# Patient Record
Sex: MALE | Race: WHITE | NOT HISPANIC OR LATINO | Employment: STUDENT | URBAN - METROPOLITAN AREA
[De-identification: names, ages, dates, MRNs, and addresses within clinical notes are randomized per-mention and may not be internally consistent; named-entity substitution may affect disease eponyms.]

---
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
## 2023-10-23 ENCOUNTER — OFFICE VISIT (OUTPATIENT)
Dept: FAMILY MEDICINE | Facility: CLINIC | Age: 18
End: 2023-10-23
Payer: COMMERCIAL

## 2023-10-23 VITALS
SYSTOLIC BLOOD PRESSURE: 128 MMHG | BODY MASS INDEX: 33.93 KG/M2 | RESPIRATION RATE: 16 BRPM | TEMPERATURE: 97.9 F | DIASTOLIC BLOOD PRESSURE: 62 MMHG | OXYGEN SATURATION: 97 % | HEIGHT: 67 IN | WEIGHT: 216.2 LBS

## 2023-10-23 DIAGNOSIS — F33.41 MAJOR DEPRESSIVE DISORDER, RECURRENT EPISODE, IN PARTIAL REMISSION (H): Primary | ICD-10-CM

## 2023-10-23 DIAGNOSIS — Z23 NEED FOR COVID-19 VACCINE: ICD-10-CM

## 2023-10-23 DIAGNOSIS — Z13.6 CARDIOVASCULAR SCREENING; LDL GOAL LESS THAN 160: ICD-10-CM

## 2023-10-23 DIAGNOSIS — Z91.09 ENVIRONMENTAL ALLERGIES: ICD-10-CM

## 2023-10-23 DIAGNOSIS — Z51.81 MEDICATION MONITORING ENCOUNTER: ICD-10-CM

## 2023-10-23 DIAGNOSIS — F41.9 ANXIETY: ICD-10-CM

## 2023-10-23 DIAGNOSIS — E66.811 CLASS 1 OBESITY WITHOUT SERIOUS COMORBIDITY WITH BODY MASS INDEX (BMI) OF 33.0 TO 33.9 IN ADULT, UNSPECIFIED OBESITY TYPE: ICD-10-CM

## 2023-10-23 DIAGNOSIS — Z23 NEED FOR HPV VACCINATION: ICD-10-CM

## 2023-10-23 PROCEDURE — 99214 OFFICE O/P EST MOD 30 MIN: CPT | Mod: 25 | Performed by: FAMILY MEDICINE

## 2023-10-23 PROCEDURE — 90480 ADMN SARSCOV2 VAC 1/ONLY CMP: CPT | Performed by: FAMILY MEDICINE

## 2023-10-23 PROCEDURE — 90471 IMMUNIZATION ADMIN: CPT | Performed by: FAMILY MEDICINE

## 2023-10-23 PROCEDURE — 90651 9VHPV VACCINE 2/3 DOSE IM: CPT | Performed by: FAMILY MEDICINE

## 2023-10-23 PROCEDURE — 91320 SARSCV2 VAC 30MCG TRS-SUC IM: CPT | Performed by: FAMILY MEDICINE

## 2023-10-23 RX ORDER — HYDROXYZINE HYDROCHLORIDE 10 MG/1
TABLET, FILM COATED ORAL
COMMUNITY
Start: 2022-12-15 | End: 2023-10-23

## 2023-10-23 RX ORDER — HYDROXYZINE HYDROCHLORIDE 10 MG/1
10 TABLET, FILM COATED ORAL EVERY 4 HOURS PRN
Qty: 30 TABLET | Refills: 1 | Status: SHIPPED | OUTPATIENT
Start: 2023-10-23

## 2023-10-23 ASSESSMENT — ANXIETY QUESTIONNAIRES
7. FEELING AFRAID AS IF SOMETHING AWFUL MIGHT HAPPEN: NOT AT ALL
5. BEING SO RESTLESS THAT IT IS HARD TO SIT STILL: SEVERAL DAYS
3. WORRYING TOO MUCH ABOUT DIFFERENT THINGS: SEVERAL DAYS
IF YOU CHECKED OFF ANY PROBLEMS ON THIS QUESTIONNAIRE, HOW DIFFICULT HAVE THESE PROBLEMS MADE IT FOR YOU TO DO YOUR WORK, TAKE CARE OF THINGS AT HOME, OR GET ALONG WITH OTHER PEOPLE: SOMEWHAT DIFFICULT
2. NOT BEING ABLE TO STOP OR CONTROL WORRYING: NOT AT ALL
GAD7 TOTAL SCORE: 3
GAD7 TOTAL SCORE: 3
6. BECOMING EASILY ANNOYED OR IRRITABLE: NOT AT ALL
1. FEELING NERVOUS, ANXIOUS, OR ON EDGE: SEVERAL DAYS
4. TROUBLE RELAXING: NOT AT ALL

## 2023-10-23 ASSESSMENT — PATIENT HEALTH QUESTIONNAIRE - PHQ9
10. IF YOU CHECKED OFF ANY PROBLEMS, HOW DIFFICULT HAVE THESE PROBLEMS MADE IT FOR YOU TO DO YOUR WORK, TAKE CARE OF THINGS AT HOME, OR GET ALONG WITH OTHER PEOPLE: SOMEWHAT DIFFICULT
SUM OF ALL RESPONSES TO PHQ QUESTIONS 1-9: 14
SUM OF ALL RESPONSES TO PHQ QUESTIONS 1-9: 14

## 2023-10-23 NOTE — PROGRESS NOTES
Assessment & Plan       ICD-10-CM    1. Major depressive disorder, recurrent episode, in partial remission (H24)  F33.41 sertraline (ZOLOFT) 50 MG tablet     TSH with free T4 reflex     REVIEW OF HEALTH MAINTENANCE PROTOCOL ORDERS     PRIMARY CARE FOLLOW-UP SCHEDULING     DISCONTINUED: sertraline (ZOLOFT) 50 MG tablet      2. Anxiety  F41.9 hydrOXYzine (ATARAX) 10 MG tablet     TSH with free T4 reflex     REVIEW OF HEALTH MAINTENANCE PROTOCOL ORDERS     PRIMARY CARE FOLLOW-UP SCHEDULING     DISCONTINUED: hydrOXYzine (ATARAX) 10 MG tablet      3. Class 1 obesity without serious comorbidity with body mass index (BMI) of 33.0 to 33.9 in adult, unspecified obesity type  E66.9 Adult Comprehensive Weight Management  Referral    Z68.33 Comprehensive metabolic panel     Lipid panel reflex to direct LDL Fasting     CBC with platelets     TSH with free T4 reflex     UA Macroscopic with reflex to Microscopic and Culture     REVIEW OF HEALTH MAINTENANCE PROTOCOL ORDERS     PRIMARY CARE FOLLOW-UP SCHEDULING      4. CARDIOVASCULAR SCREENING; LDL GOAL LESS THAN 160  Z13.6 Lipid panel reflex to direct LDL Fasting     REVIEW OF HEALTH MAINTENANCE PROTOCOL ORDERS     PRIMARY CARE FOLLOW-UP SCHEDULING      5. Environmental allergies  Z91.09 REVIEW OF HEALTH MAINTENANCE PROTOCOL ORDERS     PRIMARY CARE FOLLOW-UP SCHEDULING      6. Need for COVID-19 vaccine  Z23 COVID-19 12+ (2023-24) (PFIZER)     REVIEW OF HEALTH MAINTENANCE PROTOCOL ORDERS     PRIMARY CARE FOLLOW-UP SCHEDULING      7. Need for HPV vaccination  Z23 HPV9 (GARDASIL 9)     REVIEW OF HEALTH MAINTENANCE PROTOCOL ORDERS     PRIMARY CARE FOLLOW-UP SCHEDULING      8. Medication monitoring encounter  Z51.81 Comprehensive metabolic panel     Lipid panel reflex to direct LDL Fasting     CBC with platelets     TSH with free T4 reflex     UA Macroscopic with reflex to Microscopic and Culture     REVIEW OF HEALTH MAINTENANCE PROTOCOL ORDERS     PRIMARY CARE FOLLOW-UP  "SCHEDULING          Discussed treatment/modality options, including risk and benefits, he desires:    1) psychology / psychiatry    2)  weight loss clinic    3) fasting labs    4) Covid / HPV today, consider flu    5) med refills    All diagnosis above reviewed and noted above, otherwise stable.      See [facility] orders for further details.      BMI:   Estimated body mass index is 33.86 kg/m  as calculated from the following:    Height as of this encounter: 1.702 m (5' 7\").    Weight as of this encounter: 98.1 kg (216 lb 3.2 oz).   Weight management plan: diet and exercise    Depression Screening Follow Up        10/23/2023     9:41 AM   PHQ   PHQ-9 Total Score 14   Q9: Thoughts of better off dead/self-harm past 2 weeks Not at all     Follow Up Actions Taken  Psychology/psychiatry established      Return in about 1 year (around 10/23/2024) for Complete Physical, Medication Recheck Visit, Follow Up Chronic.   Follow-up Visit   Expected date:  Oct 23, 2024 (Approximate)      Follow Up Appointment Details:     Follow-up with whom?: PCP    Follow-Up for what?: Adult Preventive    Any Additional Chronic Condition Management?:  Anxiety  Depression       How?: In Person                   No LOS data to display    Doing chart review, history and exam, documentation and further activities as noted.           Marshall Mansfield MD, FAAFP     [facility]  [address] [place] [id number]  [facility] [email or web address]   Office: [phone]  Fax: [phone]  Pager: [phone]       Jean Lozano is a 18 year old, presenting for the following health issues:  Weight Problem        10/23/2023     9:43 AM   Additional Questions   Roomed by Roselyn KEANE       History of Present Illness       Reason for visit:  Genreral Check up and questions    He eats 2-3 servings of fruits and vegetables daily.He consumes 0 sweetened beverage(s) daily.He exercises " "with enough effort to increase his heart rate 60 or more minutes per day.  He exercises with enough effort to increase his heart rate 3 or less days per week. He is missing 2 dose(s) of medications per week.  He is not taking prescribed medications regularly due to side effects and other.     Concern - weight concerns   Onset: x 3 months   Description: weight concerns, been going to the gym x 3 months and not seeing any progress   Intensity: moderate  Progression of Symptoms:  same  Accompanying Signs & Symptoms: unable to lose any weight   Previous history of similar problem: n/a  Precipitating factors:        Worsened by: none   Alleviating factors:        Improved by: none   Therapies tried and outcome:exercise     Review of Systems   CONSTITUTIONAL: NEGATIVE for fever, chills, change in weight  INTEGUMENTARY/SKIN: NEGATIVE for worrisome rashes, moles or lesions  EYES: NEGATIVE for vision changes or irritation  ENT/MOUTH: NEGATIVE for ear, mouth and throat problems  RESP: NEGATIVE for significant cough or SOB  CV: NEGATIVE for chest pain, palpitations or peripheral edema  GI: NEGATIVE for nausea, abdominal pain, heartburn, or change in bowel habits  : NEGATIVE for frequency, dysuria, or hematuria  MUSCULOSKELETAL: NEGATIVE for significant arthralgias or myalgia  NEURO: NEGATIVE for weakness, dizziness or paresthesias  ENDOCRINE: NEGATIVE for temperature intolerance, skin/hair changes  HEME: NEGATIVE for bleeding problems  PSYCHIATRIC: NEGATIVE for changes in mood or affect      Objective    /62   Temp 97.9  F (36.6  C) (Tympanic)   Resp 16   Ht 1.702 m (5' 7\")   Wt 98.1 kg (216 lb 3.2 oz)   SpO2 97%   BMI 33.86 kg/m    Body mass index is 33.86 kg/m .    Physical Exam   GENERAL: healthy, alert and no distress  EYES: Eyes grossly normal to inspection, PERRL and conjunctivae and sclerae normal  HENT: ear canals and TM's normal, nose and mouth without ulcers or lesions  NECK: no adenopathy, no " asymmetry, masses, or scars and thyroid normal to palpation  RESP: lungs clear to auscultation - no rales, rhonchi or wheezes  CV: regular rate and rhythm, normal S1 S2, no S3 or S4, no murmur, click or rub, no peripheral edema and peripheral pulses strong  ABDOMEN: soft, nontender, no hepatosplenomegaly, no masses and bowel sounds normal  MS: no gross musculoskeletal defects noted, no edema  SKIN: no suspicious lesions or rashes  NEURO: Normal strength and tone, mentation intact and speech normal  PSYCH: mentation appears normal, affect normal/bright    Fasting labs soon

## 2023-10-30 ENCOUNTER — LAB (OUTPATIENT)
Dept: LAB | Facility: CLINIC | Age: 18
End: 2023-10-30
Payer: COMMERCIAL

## 2023-10-30 ENCOUNTER — VIRTUAL VISIT (OUTPATIENT)
Dept: SURGERY | Facility: CLINIC | Age: 18
End: 2023-10-30
Payer: COMMERCIAL

## 2023-10-30 VITALS — HEIGHT: 67 IN | WEIGHT: 216 LBS | BODY MASS INDEX: 33.9 KG/M2

## 2023-10-30 DIAGNOSIS — Z51.81 MEDICATION MONITORING ENCOUNTER: ICD-10-CM

## 2023-10-30 DIAGNOSIS — E66.811 CLASS 1 OBESITY WITHOUT SERIOUS COMORBIDITY WITH BODY MASS INDEX (BMI) OF 33.0 TO 33.9 IN ADULT, UNSPECIFIED OBESITY TYPE: ICD-10-CM

## 2023-10-30 DIAGNOSIS — F41.9 ANXIETY: ICD-10-CM

## 2023-10-30 DIAGNOSIS — F33.41 MAJOR DEPRESSIVE DISORDER, RECURRENT EPISODE, IN PARTIAL REMISSION (H): ICD-10-CM

## 2023-10-30 DIAGNOSIS — E66.09 CLASS 1 OBESITY DUE TO EXCESS CALORIES WITHOUT SERIOUS COMORBIDITY WITH BODY MASS INDEX (BMI) OF 33.0 TO 33.9 IN ADULT: Primary | ICD-10-CM

## 2023-10-30 DIAGNOSIS — Z13.6 CARDIOVASCULAR SCREENING; LDL GOAL LESS THAN 160: ICD-10-CM

## 2023-10-30 DIAGNOSIS — E66.811 CLASS 1 OBESITY DUE TO EXCESS CALORIES WITHOUT SERIOUS COMORBIDITY WITH BODY MASS INDEX (BMI) OF 33.0 TO 33.9 IN ADULT: Primary | ICD-10-CM

## 2023-10-30 LAB
ALBUMIN SERPL BCG-MCNC: 4.3 G/DL (ref 3.5–5.2)
ALBUMIN UR-MCNC: NEGATIVE MG/DL
ALP SERPL-CCNC: 75 U/L (ref 40–129)
ALT SERPL W P-5'-P-CCNC: 23 U/L (ref 0–70)
ANION GAP SERPL CALCULATED.3IONS-SCNC: 10 MMOL/L (ref 7–15)
APPEARANCE UR: CLEAR
AST SERPL W P-5'-P-CCNC: 28 U/L (ref 0–45)
BILIRUB SERPL-MCNC: 1.1 MG/DL
BILIRUB UR QL STRIP: NEGATIVE
BUN SERPL-MCNC: 13.2 MG/DL (ref 6–20)
CALCIUM SERPL-MCNC: 9.4 MG/DL (ref 8.6–10)
CHLORIDE SERPL-SCNC: 102 MMOL/L (ref 98–107)
CHOLEST SERPL-MCNC: 174 MG/DL
COLOR UR AUTO: YELLOW
CREAT SERPL-MCNC: 0.83 MG/DL (ref 0.67–1.17)
DEPRECATED HCO3 PLAS-SCNC: 27 MMOL/L (ref 22–29)
EGFRCR SERPLBLD CKD-EPI 2021: >90 ML/MIN/1.73M2
ERYTHROCYTE [DISTWIDTH] IN BLOOD BY AUTOMATED COUNT: 12.2 % (ref 10–15)
GLUCOSE SERPL-MCNC: 90 MG/DL (ref 70–99)
GLUCOSE UR STRIP-MCNC: NEGATIVE MG/DL
HCT VFR BLD AUTO: 45.2 % (ref 40–53)
HDLC SERPL-MCNC: 58 MG/DL
HGB BLD-MCNC: 15.3 G/DL (ref 13.3–17.7)
HGB UR QL STRIP: NEGATIVE
KETONES UR STRIP-MCNC: NEGATIVE MG/DL
LDLC SERPL CALC-MCNC: 101 MG/DL
LEUKOCYTE ESTERASE UR QL STRIP: NEGATIVE
MCH RBC QN AUTO: 30.5 PG (ref 26.5–33)
MCHC RBC AUTO-ENTMCNC: 33.8 G/DL (ref 31.5–36.5)
MCV RBC AUTO: 90 FL (ref 78–100)
NITRATE UR QL: NEGATIVE
NONHDLC SERPL-MCNC: 116 MG/DL
PH UR STRIP: 7 [PH] (ref 5–7)
PLATELET # BLD AUTO: 261 10E3/UL (ref 150–450)
POTASSIUM SERPL-SCNC: 4.4 MMOL/L (ref 3.4–5.3)
PROT SERPL-MCNC: 7.5 G/DL (ref 6.4–8.3)
RBC # BLD AUTO: 5.01 10E6/UL (ref 4.4–5.9)
SODIUM SERPL-SCNC: 139 MMOL/L (ref 135–145)
SP GR UR STRIP: 1.01 (ref 1–1.03)
TRIGL SERPL-MCNC: 75 MG/DL
TSH SERPL DL<=0.005 MIU/L-ACNC: 2.16 UIU/ML (ref 0.3–4.2)
UROBILINOGEN UR STRIP-ACNC: 0.2 E.U./DL
WBC # BLD AUTO: 7.5 10E3/UL (ref 4–11)

## 2023-10-30 PROCEDURE — 99205 OFFICE O/P NEW HI 60 MIN: CPT | Mod: 95 | Performed by: FAMILY MEDICINE

## 2023-10-30 PROCEDURE — 80061 LIPID PANEL: CPT

## 2023-10-30 PROCEDURE — 36415 COLL VENOUS BLD VENIPUNCTURE: CPT

## 2023-10-30 PROCEDURE — 81003 URINALYSIS AUTO W/O SCOPE: CPT

## 2023-10-30 PROCEDURE — 80053 COMPREHEN METABOLIC PANEL: CPT

## 2023-10-30 PROCEDURE — 85027 COMPLETE CBC AUTOMATED: CPT

## 2023-10-30 PROCEDURE — 84443 ASSAY THYROID STIM HORMONE: CPT

## 2023-10-30 ASSESSMENT — SLEEP AND FATIGUE QUESTIONNAIRES
HOW LIKELY ARE YOU TO NOD OFF OR FALL ASLEEP WHILE SITTING QUIETLY AFTER LUNCH WITHOUT ALCOHOL: WOULD NEVER DOZE
HOW LIKELY ARE YOU TO NOD OFF OR FALL ASLEEP WHILE SITTING INACTIVE IN A PUBLIC PLACE: WOULD NEVER DOZE
HOW LIKELY ARE YOU TO NOD OFF OR FALL ASLEEP IN A CAR, WHILE STOPPED FOR A FEW MINUTES IN TRAFFIC: WOULD NEVER DOZE
HOW LIKELY ARE YOU TO NOD OFF OR FALL ASLEEP WHILE SITTING AND TALKING TO SOMEONE: WOULD NEVER DOZE
HOW LIKELY ARE YOU TO NOD OFF OR FALL ASLEEP WHILE LYING DOWN TO REST IN THE AFTERNOON WHEN CIRCUMSTANCES PERMIT: MODERATE CHANCE OF DOZING
HOW LIKELY ARE YOU TO NOD OFF OR FALL ASLEEP WHEN YOU ARE A PASSENGER IN A CAR FOR AN HOUR WITHOUT A BREAK: WOULD NEVER DOZE
HOW LIKELY ARE YOU TO NOD OFF OR FALL ASLEEP WHILE WATCHING TV: WOULD NEVER DOZE
HOW LIKELY ARE YOU TO NOD OFF OR FALL ASLEEP WHILE SITTING AND READING: WOULD NEVER DOZE

## 2023-10-30 NOTE — PROGRESS NOTES
Marta Del Cid is 18 year old  male who presents for a billable video visit today.    How would you like to obtain your AVS? MyChart  If dropped from the video visit, the video invitation should be resent by: Send to e-mail at: [email or web address]  Will anyone else be joining your video visit? No      Video Start Time: 1:10 PM    Are there any specific questions or needs that you would like addressed at your visit today? Pt has no question or concerns          Video-Visit Details    Type of service:  Video Visit    Platform used for Video Visit: [employer]    Video End Time (time video stopped): 1:58 PM    Originating Location (pt. Location): Home      Distant Location (provider location):  On-site    Distant Location (provider location):  [facility] AND BARIATRICS CARE [place]

## 2023-10-30 NOTE — LETTER
"    10/30/2023         RE: Marta Del Cid  [address] [place] [address]        Dear Colleague,    Thank you for referring your patient, Marta Del Cid, to the [facility] [place]. Please see a copy of my visit note below.        New Medical Weight Management Consult    PATIENT:  Marta Del Cid  MRN:         [id number]  :         2005  PHILLIP:         10/30/2023    Dear Dr. Mansfield,    I had the pleasure of seeing your patient, Marta Del Cid. Full intake/assessment was done to determine barriers to weight loss success and develop a treatment plan. Marta Del Cid is a 18 year old male interested in treatment of medical problems associated with excess weight. He has a height of 5' 7\", a weight of 216 lbs 0 oz, and the calculated Body mass index is 33.83 kg/m .    ASSESSMENT/PLAN:  1. Class 1 obesity due to excess calories without serious comorbidity with body mass index (BMI) of 33.0 to 33.9 in adult  We discussed healthy habits to assist with weight loss. He will work on planning meals ahead of time using the plate method for portion control and macronutrient proportions. He will try keeping a food journal. I suggested he increase his exercise.  We discussed medication that may assist with weight loss. He may be a candidate for topamax. He declined for now. He will be following up with our dietician. He is concerned that he is not eating enough.    2. Major depressive disorder, recurrent episode, in partial remission (H24)  I believe this is contributing to his fatigue which is affecting his weight loss efforts.       - Adult Comprehensive Weight Management  Referral     He has the following co-morbidities:        10/30/2023     8:59 AM   --   I have the following health issues associated with obesity None of the above   I have the following symptoms associated with obesity Depression             10/30/2023     8:59 AM   Referring Provider   Please name the provider who " "referred you to Medical Weight Management  If you do not know, please answer \"I Don't Know\" Marshall Mansfield           10/30/2023     8:59 AM   Weight History   How concerned are you about your weight? Very Concerned   I became overweight In College   The following factors have contributed to my weight gain Mental Health Issues    Eating Wrong Types of Food    Eating Too Much    Lack of Exercise    Stress   I have tried the following methods to lose weight Watching Portions or Calories    Exercise    Atkins-type Diet (Low Carb/High Protein)    Fasting   My lowest weight since age 16 was 171   My highest weight since age 16 was 216   The most weight I have ever lost was (lbs) 47   I have the following family history of obesity/being overweight My father is overweight    One or more of my siblings are overweight   How has your weight changed over the last year? Gained   How many pounds? 45 Ibs     Patient has been struggling with depression over the past year. His motivation was very low. He took a gap year in college. His habits got off track. .More recently his has gotten back on track with exercise and he is working on eating healthier. He currently is living with his parents and 2 siblings.        10/30/2023     8:59 AM   Diet Recall Review with Patient   If you do eat breakfast, what types of food do you eat? Protein Bar  L: grilled cheese with greek yogurt   If you do eat supper, what types of food do you typically eat? Rice, Chicken, Salad   If you do snack, what types of food do you typically eat? Protein Bar, Banana, Cucumber, Cherry Tomatoes, Chips, salad   How many glasses of juice do you drink in a typical day? 0   How many of glasses of milk do you drink in a typical day? 0   How many 8oz glasses of sugar containing drinks such as Josue-Aid/sweet tea do you drink in a day? 0   How many cans/bottles of sugar pop/soda/tea/sports drinks do you drink in a day? 0   How many cans/bottles of diet pop/soda/tea or sports " drink do you drink in a day? 1   How often do you have a drink of alcohol? Never           10/30/2023     8:59 AM   Eating Habits   Generally, my meals include foods like these bread, pasta, rice, potatoes, corn, crackers, sweet dessert, pop, or juice A Few Times a Week   Generally, my meals include foods like these fried meats, brats, burgers, french fries, pizza, cheese, chips, or ice cream A Few Times a Week   Eat fast food (like McDonalds, [employer], [employer]) Less Than Weekly   Eat at a buffet or sit-down restaurant Never   Eat most of my meals in front of the TV or computer Never   Often skip meals, eat at random times, have no regular eating times Almost Everyday   Rarely sit down for a meal but snack or graze throughout A Few Times a Week   Eat extra snacks between meals Once a Week   Eat most of my food at the end of the day A Few Times a Week   Eat in the middle of the night or wake up at night to eat Never   Eat extra snacks to prevent or correct low blood sugar Never   Eat to prevent acid reflux or stomach pain Never   Worry about not having enough food to eat Never   I eat when I am depressed Never   I eat when I am stressed A Few Times a Week   I eat when I am bored A Few Times a Week   I eat when I am anxious Never   I eat when I am happy or as a reward Less Than Weekly   I feel hungry all the time even if I just have eaten Never   Feeling full is important to me Never   I finish all the food on my plate even if I am already full A Few Times a Week   I can't resist eating delicious food or walk past the good food/smell Never   I eat/snack without noticing that I am eating Never   I eat when I am preparing the meal Everyday   I eat more than usual when I see others eating Never   I have trouble not eating sweets, ice cream, cookies, or chips if they are around the house A Few Times a Week   I think about food all day Never   What foods, if any, do you crave? Sweets/Candy/Chocolate            10/30/2023     8:59 AM   Amount of Food   I feel out of control when eating Weekly   I eat a large amount of food, like a loaf of bread, a box of cookies, a pint/quart of ice cream, all at once Never   I eat a large amount of food even when I am not hungry Never   I eat rapidly Never   I eat alone because I feel embarrassed and do not want others to see how much I have eaten Never   I eat until I am uncomfortably full Never   I feel bad, disgusted, or guilty after I overeat Everyday           10/30/2023     8:59 AM   Activity/Exercise History   How much of a typical 12 hour day do you spend sitting? Half the Day   How much of a typical 12 hour day do you spend lying down? Less Than Half the Day   How much of a typical day do you spend walking/standing? Less Than Half the Day   How many hours (not including work) do you spend on the TV/Video Games/Computer/Tablet/Phone? 4-5 Hours   How many times a week are you active for the purpose of exercise? 2-3 Times a Week- goes to the gym. Strength training and eliptical   What keeps you from being more active? Too tired   How many total minutes do you spend doing some activity for the purpose of exercising when you exercise? More Than 30 Minutes     He states that he has been exercising for 2 months and instead of losing weight he has been gaining weight. His clothes feel tighter. He used to stress eat often when he was younger. He states he occasionally tracks his food and tends to stay under 1600 calories. His primary MD ordered some blood tests which are pending.     PAST MEDICAL HISTORY:  Past Medical History:   Diagnosis Date     Anxiety      CARDIOVASCULAR SCREENING; LDL GOAL LESS THAN 160      Environmental allergies      Major depressive disorder, recurrent episode, in partial remission (H24)            10/30/2023     8:59 AM   Work/Social History Reviewed With Patient   My employment status is Unemployed    Student   What is your marital status? Single   Who do you  live with? Parents   Who does the food shopping? Mother           10/30/2023     8:59 AM   Mental Health History Reviewed With Patient   Have you ever been physically or sexually abused? No   How often in the past 2 weeks have you felt little interest or pleasure in doing things? More Than Half the Days   Over the past 2 weeks how often have you felt down, depressed, or hopeless? More Than Half the Days     Patient has depression and anxiety. The anxiety has improved dramatically in the past year. He states he no longer has real panic attacks. He is sensitive to caffeine. A cup of coffee will make him anxious for the rest of the day.        10/30/2023     8:59 AM   Sleep History Reviewed With Patient   How many hours do you sleep at night? 6       MEDICATIONS:   Current Outpatient Medications   Medication Sig Dispense Refill     hydrOXYzine (ATARAX) 10 MG tablet Take 1 tablet (10 mg) by mouth every 4 hours as needed for anxiety 30 tablet 1     IBUPROFEN as needed       sertraline (ZOLOFT) 50 MG tablet Take 1 tablet (50 mg) by mouth daily 90 tablet 1       ALLERGIES:   Allergies   Allergen Reactions     Seasonal Allergies      ROS  General  Fatigue: yes  HEENT  Hx of glaucoma: no  Vision changes: no  Cardiovascular  Hx of heart disease: no  Chest Pain with Exertion: sometimes  Palpitations: no  Pulmonary  Shortness of breath at rest: no  Shortness of breath with exertion: no  Gastrointestinal  Heartburn: no  Pancreatitis: no  Psychiatric  Moods Stable: somewhat  Endocrine  Polydipsia: no  No personal or family history of medullary thyroid cancer: no  Neurologic  Hx of seizures: no  Migraine headaches: history of    Birth control: male  Kidney stones: no     PHYSICAL EXAM:  Wt Readings from Last 4 Encounters:   10/30/23 98 kg (216 lb)   10/23/23 98.1 kg (216 lb 3.2 oz)   08/10/21 77.8 kg (171 lb 9.6 oz) (80%, Z= 0.85)*   09/04/19 89.7 kg (197 lb 12.8 oz) (97%, Z= 1.93)*     * Growth percentiles are based on CDC  (Boys, 2-20 Years) data.      BP Readings from Last 3 Encounters:   10/23/23 128/62   08/10/21 108/64 (20%, Z = -0.84 /  37%, Z = -0.33)*   09/04/19 116/73 (62%, Z = 0.31 /  78%, Z = 0.77)*     *BP percentiles are based on the 2017 AAP Clinical Practice Guideline for boys      GENERAL: Healthy, alert and no distress  EYES: Eyes grossly normal to inspection.  No discharge or erythema, or obvious scleral/conjunctival abnormalities.  RESP: No audible wheeze, cough, or visible cyanosis.  No visible retractions or increased work of breathing.    SKIN: Visible skin clear. No significant rash, abnormal pigmentation or lesions.  NEURO: Cranial nerves grossly intact.  Mentation and speech appropriate for age.  PSYCH: Mentation appears normal, affect normal/bright, judgement and insight intact, normal speech and appearance well-groomed.     FOLLOW-UP:   12 weeks.    Total time spent on the date of this encounter doing: chart review, review of test results, patient visit, physical exam, education, counseling, developing plan of care and documenting = 60 minutes.     Sincerely,    REA Murcai MD          Ali PHONG Ambrosiosa is 18 year old  male who presents for a billable video visit today.    How would you like to obtain your AVS? MyChart  If dropped from the video visit, the video invitation should be resent by: Send to e-mail at: [email or web address]  Will anyone else be joining your video visit? No      Video Start Time: 1:10 PM    Are there any specific questions or needs that you would like addressed at your visit today? Pt has no question or concerns          Video-Visit Details    Type of service:  Video Visit    Platform used for Video Visit: [employer]    Video End Time (time video stopped): 1:58 PM    Originating Location (pt. Location): Home      Distant Location (provider location):  On-site    Distant Location (provider location):  [facility] SURGERY [facility] AND BARIATRICS CARE [place]        Again, thank you for  allowing me to participate in the care of your patient.        Sincerely,        REA Murcia MD

## 2023-10-30 NOTE — PROGRESS NOTES
"    New Medical Weight Management Consult    PATIENT:  Marta Del Cid  MRN:         [id number]  :         2005  PHILLIP:         10/30/2023    Dear Dr. Mansfield,    I had the pleasure of seeing your patient, Marta Del Cid. Full intake/assessment was done to determine barriers to weight loss success and develop a treatment plan. Marta Del Cid is a 18 year old male interested in treatment of medical problems associated with excess weight. He has a height of 5' 7\", a weight of 216 lbs 0 oz, and the calculated Body mass index is 33.83 kg/m .    ASSESSMENT/PLAN:  1. Class 1 obesity due to excess calories without serious comorbidity with body mass index (BMI) of 33.0 to 33.9 in adult  We discussed healthy habits to assist with weight loss. He will work on planning meals ahead of time using the plate method for portion control and macronutrient proportions. He will try keeping a food journal. I suggested he increase his exercise.  We discussed medication that may assist with weight loss. He may be a candidate for topamax. He declined for now. He will be following up with our dietician. He is concerned that he is not eating enough.    2. Major depressive disorder, recurrent episode, in partial remission (H24)  I believe this is contributing to his fatigue which is affecting his weight loss efforts.       - Adult Comprehensive Weight Management  Referral     He has the following co-morbidities:        10/30/2023     8:59 AM   --   I have the following health issues associated with obesity None of the above   I have the following symptoms associated with obesity Depression             10/30/2023     8:59 AM   Referring Provider   Please name the provider who referred you to Medical Weight Management  If you do not know, please answer \"I Don't Know\" Marshall Mansfield           10/30/2023     8:59 AM   Weight History   How concerned are you about your weight? Very Concerned   I became overweight In College   The following factors have " contributed to my weight gain Mental Health Issues    Eating Wrong Types of Food    Eating Too Much    Lack of Exercise    Stress   I have tried the following methods to lose weight Watching Portions or Calories    Exercise    Atkins-type Diet (Low Carb/High Protein)    Fasting   My lowest weight since age 16 was 171   My highest weight since age 16 was 216   The most weight I have ever lost was (lbs) 47   I have the following family history of obesity/being overweight My father is overweight    One or more of my siblings are overweight   How has your weight changed over the last year? Gained   How many pounds? 45 Ibs     Patient has been struggling with depression over the past year. His motivation was very low. He took a gap year in college. His habits got off track. .More recently his has gotten back on track with exercise and he is working on eating healthier. He currently is living with his parents and 2 siblings.        10/30/2023     8:59 AM   Diet Recall Review with Patient   If you do eat breakfast, what types of food do you eat? Protein Bar  L: grilled cheese with greek yogurt   If you do eat supper, what types of food do you typically eat? Rice, Chicken, Salad   If you do snack, what types of food do you typically eat? Protein Bar, Banana, Cucumber, Cherry Tomatoes, Chips, salad   How many glasses of juice do you drink in a typical day? 0   How many of glasses of milk do you drink in a typical day? 0   How many 8oz glasses of sugar containing drinks such as Josue-Aid/sweet tea do you drink in a day? 0   How many cans/bottles of sugar pop/soda/tea/sports drinks do you drink in a day? 0   How many cans/bottles of diet pop/soda/tea or sports drink do you drink in a day? 1   How often do you have a drink of alcohol? Never           10/30/2023     8:59 AM   Eating Habits   Generally, my meals include foods like these bread, pasta, rice, potatoes, corn, crackers, sweet dessert, pop, or juice A Few Times a Week    Generally, my meals include foods like these fried meats, brats, burgers, french fries, pizza, cheese, chips, or ice cream A Few Times a Week   Eat fast food (like McDonalds, Burger Tim, Taco Bell) Less Than Weekly   Eat at a buffet or sit-down restaurant Never   Eat most of my meals in front of the TV or computer Never   Often skip meals, eat at random times, have no regular eating times Almost Everyday   Rarely sit down for a meal but snack or graze throughout A Few Times a Week   Eat extra snacks between meals Once a Week   Eat most of my food at the end of the day A Few Times a Week   Eat in the middle of the night or wake up at night to eat Never   Eat extra snacks to prevent or correct low blood sugar Never   Eat to prevent acid reflux or stomach pain Never   Worry about not having enough food to eat Never   I eat when I am depressed Never   I eat when I am stressed A Few Times a Week   I eat when I am bored A Few Times a Week   I eat when I am anxious Never   I eat when I am happy or as a reward Less Than Weekly   I feel hungry all the time even if I just have eaten Never   Feeling full is important to me Never   I finish all the food on my plate even if I am already full A Few Times a Week   I can't resist eating delicious food or walk past the good food/smell Never   I eat/snack without noticing that I am eating Never   I eat when I am preparing the meal Everyday   I eat more than usual when I see others eating Never   I have trouble not eating sweets, ice cream, cookies, or chips if they are around the house A Few Times a Week   I think about food all day Never   What foods, if any, do you crave? Sweets/Candy/Chocolate           10/30/2023     8:59 AM   Amount of Food   I feel out of control when eating Weekly   I eat a large amount of food, like a loaf of bread, a box of cookies, a pint/quart of ice cream, all at once Never   I eat a large amount of food even when I am not hungry Never   I eat rapidly  Never   I eat alone because I feel embarrassed and do not want others to see how much I have eaten Never   I eat until I am uncomfortably full Never   I feel bad, disgusted, or guilty after I overeat Everyday           10/30/2023     8:59 AM   Activity/Exercise History   How much of a typical 12 hour day do you spend sitting? Half the Day   How much of a typical 12 hour day do you spend lying down? Less Than Half the Day   How much of a typical day do you spend walking/standing? Less Than Half the Day   How many hours (not including work) do you spend on the TV/Video Games/Computer/Tablet/Phone? 4-5 Hours   How many times a week are you active for the purpose of exercise? 2-3 Times a Week- goes to the gym. Strength training and eliptical   What keeps you from being more active? Too tired   How many total minutes do you spend doing some activity for the purpose of exercising when you exercise? More Than 30 Minutes     He states that he has been exercising for 2 months and instead of losing weight he has been gaining weight. His clothes feel tighter. He used to stress eat often when he was younger. He states he occasionally tracks his food and tends to stay under 1600 calories. His primary MD ordered some blood tests which are pending.     PAST MEDICAL HISTORY:  Past Medical History:   Diagnosis Date    Anxiety     CARDIOVASCULAR SCREENING; LDL GOAL LESS THAN 160     Environmental allergies     Major depressive disorder, recurrent episode, in partial remission (H24)            10/30/2023     8:59 AM   Work/Social History Reviewed With Patient   My employment status is Unemployed    Student   What is your marital status? Single   Who do you live with? Parents   Who does the food shopping? Mother           10/30/2023     8:59 AM   Mental Health History Reviewed With Patient   Have you ever been physically or sexually abused? No   How often in the past 2 weeks have you felt little interest or pleasure in doing things?  More Than Half the Days   Over the past 2 weeks how often have you felt down, depressed, or hopeless? More Than Half the Days     Patient has depression and anxiety. The anxiety has improved dramatically in the past year. He states he no longer has real panic attacks. He is sensitive to caffeine. A cup of coffee will make him anxious for the rest of the day.        10/30/2023     8:59 AM   Sleep History Reviewed With Patient   How many hours do you sleep at night? 6       MEDICATIONS:   Current Outpatient Medications   Medication Sig Dispense Refill    hydrOXYzine (ATARAX) 10 MG tablet Take 1 tablet (10 mg) by mouth every 4 hours as needed for anxiety 30 tablet 1    IBUPROFEN as needed      sertraline (ZOLOFT) 50 MG tablet Take 1 tablet (50 mg) by mouth daily 90 tablet 1       ALLERGIES:   Allergies   Allergen Reactions    Seasonal Allergies      ROS  General  Fatigue: yes  HEENT  Hx of glaucoma: no  Vision changes: no  Cardiovascular  Hx of heart disease: no  Chest Pain with Exertion: sometimes  Palpitations: no  Pulmonary  Shortness of breath at rest: no  Shortness of breath with exertion: no  Gastrointestinal  Heartburn: no  Pancreatitis: no  Psychiatric  Moods Stable: somewhat  Endocrine  Polydipsia: no  No personal or family history of medullary thyroid cancer: no  Neurologic  Hx of seizures: no  Migraine headaches: history of    Birth control: male  Kidney stones: no     PHYSICAL EXAM:  Wt Readings from Last 4 Encounters:   10/30/23 98 kg (216 lb)   10/23/23 98.1 kg (216 lb 3.2 oz)   08/10/21 77.8 kg (171 lb 9.6 oz) (80%, Z= 0.85)*   09/04/19 89.7 kg (197 lb 12.8 oz) (97%, Z= 1.93)*     * Growth percentiles are based on CDC (Boys, 2-20 Years) data.      BP Readings from Last 3 Encounters:   10/23/23 128/62   08/10/21 108/64 (20%, Z = -0.84 /  37%, Z = -0.33)*   09/04/19 116/73 (62%, Z = 0.31 /  78%, Z = 0.77)*     *BP percentiles are based on the 2017 AAP Clinical Practice Guideline for boys      GENERAL:  Healthy, alert and no distress  EYES: Eyes grossly normal to inspection.  No discharge or erythema, or obvious scleral/conjunctival abnormalities.  RESP: No audible wheeze, cough, or visible cyanosis.  No visible retractions or increased work of breathing.    SKIN: Visible skin clear. No significant rash, abnormal pigmentation or lesions.  NEURO: Cranial nerves grossly intact.  Mentation and speech appropriate for age.  PSYCH: Mentation appears normal, affect normal/bright, judgement and insight intact, normal speech and appearance well-groomed.     FOLLOW-UP:   12 weeks.    Total time spent on the date of this encounter doing: chart review, review of test results, patient visit, physical exam, education, counseling, developing plan of care and documenting = 60 minutes.     Sincerely,    REA Murcia MD

## 2023-10-30 NOTE — PATIENT INSTRUCTIONS
Eat Better ? Move More ? Live Well    Eat 3 nutrient-rich meals each day    Don t skip meals--it will cause you to overeat later in the day!    Eating fiber (vegetables/fruits/whole grains) and protein with meals helps you stay full longer    Choose foods with less than 10 grams of sugar and 5 grams of fat per serving to prevent excess calories and weight re-gain  Eat around the same times each day to develop a routine eating schedule   Avoid snacking unless physically hungry.   Planned snacks: 1-2 times per day and no more than 150 calories    Eat protein first   Protein helps with healing, maintaining adequate muscle mass, reducing hunger and optimizing nutritional status   Aim for 60-80 grams of protein per day   Fill up on Fiber   Fiber comes from plants--fruits, veggies, whole grains, nuts/seeds and beans   Fiber is low in calories, high in phytonutrients and helps you stay full longer   Aim for 25-35 grams per day by eating fiber with meals and snacks  Eat [id number]   Take 20-30 minutes to eat each meal by taking small bites, chewing foods to applesauce consistency or 20-30 times before you swallow   Eating foods too fast can delay satiety/fullness signals and increase overeating   Slow down your eating by using toddler utensils, putting your fork/spoon down between bites and not watching TV or emailing during meals!   Keep a Journal         Writing down what you eat, how you feel and when you are active helps you identify new changes to work on from week to week         Look for ways to cut 100 calories from your current diet 2-3 times per day  Drink 64 ounces of 0-Calorie drinks between meals   Water   Zero calorie Propel  or Vitamin Water     SoBe [place]  Zero Calories   Crystal Light , Sugar-Free Josue-Aid , and other sugar-free lemonade or flavored marie   Keep Caffeine to less than 300mg per day ie: 3-6oz cups coffee     Work up to 45-60 minutes of physical activity most days of the week   Helps  with losing weight and prevent regaining those extra pounds!    Do a combo of cardio (walking/water exercises) and strength training (lifting weights/Vinyasa yoga)    Avoid Mindless Eating   Be present when you eat--take note of the smell, taste and quality of your food   Make a list of alternative activities you could do to prevent eating out of boredom/stress  Go for a walk, call a friend, chew gum, paint your nails, re-organize the garage, etc    LEAN PROTEIN SOURCES      Protein Source Portion Calories Grams of Protein                           Nonfat, plain Greek yogurt    (10 grams sugar or less) 3/4 cup (6 oz)  12-17   Light Yogurt (10 grams sugar or less) 3/4 cup (6 oz)  6-8   Protein Shake 1 shake 110-180 15-30   Skim/1% Milk or lactose-free milk 1 cup ( 8 oz)  8   Plain or light, flavored soymilk 1 cup  7-8   Plain or light, hemp milk 1 cup 110 6   Fat Free or 1% Cottage Cheese 1/2 cup 90 15   Part skim ricotta cheese 1/2 cup 100 14   Part skim or reduced fat cheese slices 1 ounce 65-80 8     Mozzarella String Cheese 1 80 8   Canned tuna, chicken, crab or salmon  (canned in water)  1/2 cup 100 15-20   White fish (broiled, grilled, baked) 3 ounces 100 21   [place]/Tuna (broiled, grilled, baked) 3 ounces 150-180 21   Shrimp, Scallops, Lobster, Crab 3 ounces 100 21   Pork loin, Pork Tenderloin 3 ounces 150 21   Boneless, skinless chicken /turkey breast                          (broiled, grilled, baked) 3 ounces 120 21   [place], [place], [place], and Venison 3 ounces 120 21   Lean cuts of red meat and pork (sirloin,   round, tenderloin, flank, ground 93%-96%) 3 ounces 170 21   Lean or Extra Lean Ground Turkey 1/2 cup 150 20   90-95% Lean [place] Burger 1 kyler 140-180 21   Low-fat casserole with lean meat 3/4 cup 200 17   Luncheon Meats                                                        (turkey, lean ham, roast beef, chicken) 3 ounces 100 21   Egg (boiled, poached, scrambled) 1 Egg 60 7    Egg Substitute 1/2 cup 70 10   Nuts (limit to 1 serving per day)  3 Tbsp. 150 7   Nut [place] (peanut, almond)  Limit to 1 serving or less daily 1 Tbsp. 90 4   Soy Burger (varies) 1  15   Garbanzo, Black, [place]ega Beans 1/2 cup 110 7   Refried Beans 1/2 cup 100 7   Kidney and Lima beans 1/2 cup 110 7   Tempeh 3 oz 175 18   Vegan crumbles 1/2 cup 100 14   Tofu 1/2 cup 110 14   Chili (beans and extra lean beef or turkey) 1 cup 200 23   Lentil Stew/Soup 1 cup 150 12   Black Bean Soup 1 cup 175 12

## 2023-11-06 ENCOUNTER — VIRTUAL VISIT (OUTPATIENT)
Dept: SURGERY | Facility: CLINIC | Age: 18
End: 2023-11-06
Payer: COMMERCIAL

## 2023-11-06 DIAGNOSIS — E66.9 OBESITY (BMI 30-39.9): Primary | ICD-10-CM

## 2023-11-06 PROCEDURE — 97802 MEDICAL NUTRITION INDIV IN: CPT | Mod: 95

## 2023-11-06 NOTE — PATIENT INSTRUCTIONS
"Initial Consult / Weight Loss    My Plate   [email or web address]      Food Label: The 10-10 Rule  [email or web address]         High protein breakfast ideas:  -[place] products (high protein brand) - oatmeal, muffins, pancakes, etc.  -Overnight oats - there are easy \"just add water\" kinds in the grocery store or lots of recipes out there, look for one that has added protein from liquid or powder or other source  -Breakfast Egg Casseroles  -Scrambled eggs with cottage cheese whipped in  -English muffin breakfast sandwiches or burritos  -Frozen breakfast bowls OR \"Add an Egg\" bowls  -Protein bars - 10/10 rule is a good rule of thumb (Brands: [place] Protein Bar, RX bar, Skout Protein Bars)  -High protein tortillas or low carb tortilla for breakfast burrito  -Turkey, Veggie, Chicken, Black bean burgers in the frozen section - add cheese and fried egg on top  -Greek Yogurt (examples: plain greek, Oikos Triple Zero, Dannon Light N Fit, Two Good Yogurt), with choice of fresh or frozen fruit, neeraj seeds, hemp seeds, nuts  -1 slice whole wheat bread with mini avocado or half regular sized avocado, \"Everything But the Bagel\" seasoning, Serbian go on the side or on top  -2-3 light string cheeses with fruit (banana, fruit cup, berries, etc.)  -Cottage cheese and fruit on top  -Breakfast Skillet: sauteed bell peppers, onions with eggs and sprinkle of cheese (tip: batch prep sauteed peppers and onions for the week for a faster breakfast)       Eat Better ? Move More ? Live Well    Eat 3 nutrient-rich meals each day     Don't skip meals--it will cause you to overeat later in the day!     Eating fiber (vegetables/fruits/whole grains) and protein with meals helps you stay full longer     Choose foods with less than 10 grams of sugar and 5 grams of fat per serving to prevent excess calories and weight re-gain   Eat around the same times each day to develop a routine eating schedule    Avoid snacking unless " physically hungry.   Planned snacks: 1-2 times per day and no more than 150 calories    Eat protein first    Protein helps with healing, maintaining adequate muscle mass, reducing hunger and optimizing nutritional status    Aim for  grams of protein per day   Fill up on Fiber    Fiber comes from plants--fruits, veggies, whole grains, nuts/seeds and beans    Fiber is low in calories, high in phytonutrients and helps you stay full longer    Aim for 25-35 grams per day by eating fiber with meals and snacks  Eat [id number]    Take 20-30 minutes to eat each meal by taking small bites, chewing foods to applesauce consistency or 20-30 times before you swallow    Eating foods too fast can delay satiety/fullness signals and increase overeating   Slow down your eating by using toddler utensils, putting your fork/spoon down between bites and not watching TV or emailing during meals!   Keep a Journal          Writing down what you eat, how you feel and when you are active helps you identify new changes to work on from week to week          Look for ways to cut 100 calories from your current diet 2-3 times per day  Drink 64 ounces of 0-Calorie drinks between meals    Water    Zero calorie Propel  or Vitamin Water      SoBe Lifewater  Zero Calories    Crystal Light , Sugar-Free Josue-Aid , and other sugar-free lemonade or flavored marie    Keep Caffeine to less than 300mg per day ie: 3-6oz cups coffee     Work up to 45-60 minutes of physical activity most days of the week    Helps with losing weight and prevent regaining those extra pounds!     Do a combo of cardio (walking/water exercises) and strength training (lifting weights/Vinyasa yoga)    Avoid Mindless Eating    Be present when you eat--take note of the smell, taste and quality of your food    Make a list of alternative activities you could do to prevent eating out of boredom/stress  Go for a walk, call a friend, chew gum, paint your nails, re-organize the garage,  etc      LEAN PROTEIN SOURCES    Protein Source Portion Calories Grams of Protein                           Nonfat, plain Greek yogurt    (10 grams sugar or less) 3/4 cup (6 oz)  12-17   Light Yogurt (10 grams sugar or less) 3/4 cup (6 oz)  6-8   Protein Shake 1 shake 110-180 15-30   Skim/1% Milk or lactose-free milk 1 cup ( 8 oz)  8   Plain or light, flavored soymilk 1 cup  7-8   Plain or light, hemp milk 1 cup 110 6   Fat Free or 1% Cottage Cheese 1/2 cup 90 15   Part skim ricotta cheese 1/2 cup 100 14   Part skim or reduced fat cheese slices 1/4cup, 3 dice 65-80 8     Mozzarella String Cheese 1 80 8   Canned tuna, chicken, crab or salmon  (canned in water)  1/2 cup 100 15-20   White fish (broiled, grilled, baked) 3 ounces 100 21   [place]/Tuna (broiled, grilled, baked) 3 ounces 150-180 21   Shrimp, Scallops, Lobster, Crab 3 ounces 100 21   Pork loin, Pork Tenderloin 3 ounces 150 21   Boneless, skinless chicken /turkey breast                          (broiled, grilled, baked) 3 ounces 120 21   Blunt, [place], [place], and Venison 3 ounces 120 21   Lean cuts of red meat and pork (sirloin,   round, tenderloin, flank, ground 93%-96%) 3 ounces 170 21   Lean or Extra Lean Ground Turkey 1/2 cup 150 20   90-95% Lean [place] Burger 1 kyler 140-180 21   Low-fat casserole with lean meat 3/4 cup 200 17   Luncheon Meats                                                        (turkey, lean ham, roast beef, chicken) 3 ounces 100 21   Egg (boiled, poached, scrambled) 1 Egg 60 7   Egg Substitute 1/2 cup 70 10   Nuts (limit to 1 serving per day)  3 Tbsp. 150 7   Nut [place] (peanut, almond)  Limit to 1 serving or less daily 1 Tbsp. 90 4   Soy Burger (varies) 1  10-15   Edamame  1/2 cup ~95 9   Garbanzo, Black, Vega Beans 1/2 cup 110 7   Refried Beans 1/2 cup 100 7   Kidney and Lima beans 1/2 cup 110 7   Tempeh 3 oz 175 18   Vegan crumbles 1/2 cup 100 14   Tofu 1/2 cup 110 14   Chili (beans and extra lean  beef or turkey) 1 cup 200 23   Lentil Stew/Soup 1 cup 150 12   Black Bean Soup 1 cup 175 12     Carbohydrates  Carbohydrates fuel your body with glucose (sugar)--the energy your body needs so you can do your daily activities.  Carbohydrates offer an immediate source of energy for your body. They provide the fuel for your muscles and organs, such as your brain.     Types of Carbohydrates     Complex Carbohydrates are higher in fiber and keep you feeling full longer--helping you eat less.   These are found in nearly all plant-based foods and usually take longer for the body to digest.  They are most commonly found in whole-wheat bread, whole-grain pasta, brown rice, starchy vegetables,   and fruits  Refined Carbohydrates require almost NO WORK for digestion and break down into glucose more quickly   than complex carbohydrates. Refined carbohydrates are usually high in calories and low in nutrients and fiber--  eating more of these can lead to weight gain.  Thinking about eliminating carbohydrates???  If you do not eat enough carbohydrates, the following can occur:  Fatigue  Muscle cramps  Poor mental function  Fatigue easily results from deprivation of carbohydrates, which is seen in people who fast, possibly interfering with activities of daily living.      Thinking about eliminating carbohydrates???  If you do not eat enough carbohydrates, the following can occur:  Fatigue  Muscle cramps  Poor mental function  Fatigue easily results from deprivation of carbohydrates, which is seen in people who fast, possibly interfering with activities of daily living    Carbohydrates are your body's first choice for fuel. If given a choice of several types of foods simultaneously, your body will use the energy from carbohydrates first.    What foods contain carbohydrates?  Choose the following foods containing carbohydrates (the BEST ones to eat):   Fruit-fresh, frozen, canned in their own juices  Whole grains:  Whole-wheat  breads  Brown rice  Oatmeal  Whole-grain cereals  Other starchy foods containing a minimum of 3 grams (g) fiber/100 calories  The ingredient label should list whole wheat or whole grain as one of the first ingredients (bulgur, quinoa, buckwheat, millet, spelt, faro, kasha)  Milk or yogurt (a natural source of carbohydrates):  Low-fat milk  Fat-free milk  Yogurt   Beans or legumes     Starchy vegetables, raw or frozen:  Potatoes  Peas  Corn    AVOID or limit the following foods containing carbohydrates:  Refined sugars, such as in:  Candy  Desserts-ice cream, cakes, pies, brownies, frozen yogurt, sherbet/sorbet  Cookies  White flour: bread/pasta/crackers/rice/tortillas  Sugary snacks: sweetened cereal, granola bars, cereal bars, donuts, muffins, bagels  Sugary Drinks:  Fruit Juice, Smoothies  Sports Drinks  Regular Soda    What are typical serving sizes or portions?  The following are some serving and portion sizes for foods containing carbohydrates:  One medium piece of fruit, about 4?5 ounces (oz) (-tennis ball)  1 cup (C) berries or melon    C canned fruit    C juice (100% vegetable)    C starchy vegetables, cooked or chopped  One slice whole-grain bread  ? C brown rice, quinoa, buckwheat, millet, spelt, faro, kasha    C oatmeal (dry)    C bulgur  One small tortilla (less than 6inch diameter)    C wheat germ  1 oz pretzels     C flaked cereal        Calorie-Controlled Sample Meal Plans    Examples of small healthy meals    Breakfast   Omelet made with   cup to   cup egg substitute or 2 eggs    cup chopped vegetables  1-2 tbsp. of light cheese     cup salsa  Medium banana    1 cup non-fat plain, Greek yogurt mixed with 1 cup berries and 1-2 Tbsp nuts or cereal   -3/4 cup skim or 1% cottage cheese    cup unsweetened whole-grain cereal  1/2 cup of fresh strawberries  Whole-wheat English muffin or mini bagel, 1 scrambled egg and 1 slice Swiss cheese   Small orange  Protein Bar or Shake (15-30 grams protein and 15-25  grams Carbohydrates)    cup cottage cheese, low-fat    cup fresh fruit    11 ounces of Slim Fast Low Carb (only), Ann's Advantage, EAS Carb Control    Lunch/Dinner  2-3 slices roasted turkey breast  1 tbsp. of fat free mayonnaise  2 slices of  whole-wheat bread, Medium apple  10 baby carrots with 1 tbsp. of low-fat dip     cup water packed tuna or chicken  1 tablespoons of low-fat mayonnaise  1-2 tbsp. dill relish  1 serving of whole-grain crackers  1 cup of strawberries   6 inch turkey sub sandwich with light mayonnaise,   cup cottage cheese                                                                                                                                                      Black bean and low-fat cheese on a whole wheat tortilla with salsa and light sour cream  Grilled chicken sandwich  Tossed salad with light dressing    Baked potato with 3/4 cup of extra lean ground beef, light shredded cheese and salsa  Fresh fruit                                                 Chicken chunks with lettuce and vegetables stuffed in peyton  Steamed broccoli                                                 3 oz boneless/skinless chicken breast  1/2 cup brown rice with stir-fried vegetables    grapefruit  3 ounces of salmon, trout, or tuna  1 cup of steamed asparagus  1 small slice whole grain Italian bread  Broiled white or pink fish  3/4 cup whole wheat pasta with tomatoes  3/4 cup of roasted red peppers  3 oz. of extra lean (93/7) hamburger on a Arnold's [place]ler Thins  Tossed salad with light dressing       Black bean or Tuscan bean soup with grated mozzarella cheese    of a flour tortilla    3 ounces of grilled pork loin with 1 tbsp. of low-sugar barbeque sauce, 1 cup of green beans seasoned with pepper  Small dinner roll or   cup of grapefruit sections    1-2 cups of torn danielle    cup of garbanzo beans or diced skinless chicken breast  5-6 cherry tomatoes  1  tbsp. of crumbled feta cheese  1 tbsp. of roasted  soy nuts  1 tsp. of olive oil and 2-3 Tbsp. of balsamic or red wine vinegar  Small whole-wheat dinner roll or   cup of cut up pineapple   Protein Supplements     Type Serving Calories Protein Grams Sugar Grams Where Available   Muscle Milk Pro Series Shake 1 bottle  170 32 1 [facility], [employer]   Premier Protein Shake 1 bottle 160 30 1 [employer]  [facility], [facility], Cub   Equate High Performance Shake 1 bottle  160 30 1 [facility]   [facility] Nutrition Plan Shake 1 bottle 150 30 2 [employer], [employer]   Ensure Max Protein  Shake 1 bottle 150 30 1 [facility], Target   [facility] Core Power Shake 1 bottle 170 26 5 [facility]   Pure Protein Shake 1 bottle 110-170 23 1  [employer]i's, [facility], Target   Slim Fast   High Protein Shake 1 bottle 180 20 1 [facility], Target, Grocery/Pharmacy   100kcal Muscle Milk Shake 1 bottle 100 20 0 [facility], [facility], [facility],Grocery/Pharmacy   Ann's Lift Water 1 bottle 90 20 0 [facility], Target   Isopure Zero Carb Water   bottle 80 20 0 [employer], Vitamin Shoppe, online   Premier Protein Clear Water 1 bottle 90 20 0 [facility]    Unjury Protein+ Powder 1 scoop 90 20 0 www.mSnap  wwwPolyheal   Advantage Shake  1 bottle 160 15-17 1 [facility], Target  Grocery/Pharmacy   100kcal Muscle Milk Powder 1 scoop 100 15 0 [facility], [email or web address]   Protein 2-0 Water 1 bottle 60-70 15 0 [facility]         Plant-Based Protein Supplements     Type Serving  Calories Protein   Grams Sugar Grams  Where Available    Garden of Life Raw Protein Powder Powder 1 scoop 110 22 0 Whole Foods, Vitamin Shoppe, wwwPolyheal   Orgain Organic Protein Powder Powder 2 scoops 150 21 0 Target, [facility], [employer]   Planted by Unjury Powder 1 scoop 130 20 3 www.unjury.com  www.amazon.com   Protein and Greens by Gray Powder  1 scoop 120 20 0 [facility], Target, Grocery/Pharmacy   Essentials Shake by Gray Powder 1 scoop 130 20 0 [facility], Target, Grocery/Pharmacy     Plant Fusion Orgain Plant Protein Powder 1 scoop 120 20 0  www.plantfusion.net   Orgain Grass Fed Shake Shake 1 bottle 140 20 4 Target, [facility]   Evolve Shake 1 bottle 150 20 4 Target, [place], [facility]   Sun Warrior Powder 1 scoop 100 17-18 0 GNC, Whole Foods   Whole Foods Fit Protein  Powder 1 scoop 110 17 0 Whole Foods   Garden of Life Plant Protein  Powder 1 scoop 90 15 0 Whole Foods, Vitamin Shoppe,   www.amazon.com     Look for (per serving):  -100-200 calories  -15-30 grams protein   -Less than 10 grams total carbohydrate  -10/10 Rule

## 2023-11-06 NOTE — PROGRESS NOTES
"Marta Del Cid is a 18 year old who is being evaluated via a billable video visit.      How would you like to obtain your AVS? MyChart  If the video visit is dropped, the invitation should be resent by: Send to e-mail at: [email or web address]  Will anyone else be joining your video visit? No          Medical Weight Loss Initial Diet Evaluation  Assessment:  This patient was referred by Dr. Murcia for MNT as treatment for Obesity.   Marta is presenting today for a new weight management nutrition consultation. Pt has had an initial appointment with Dr. Murcia.    Weight loss medication:  none .     Personal Goals: overall weight loss     Anthropometrics:    Pt's weight is 213 lbs  Initial weight: 216 lbs  Weight change: 3 lbs down  BMI: 33.8  Ideal body weight: 66.1 kg (145 lb 11.6 oz)  Adjusted ideal body weight: 78.9 kg (173 lb 13.4 oz)  Estimated RMR ([place]-St Jeor equation):  1785 kcals x 1.2 (sedentary) = 2140 kcals (for weight maintenance)      Recommended Protein Intake:  grams of protein/day    Medical History:  Patient Active Problem List   Diagnosis    Myopia    CARDIOVASCULAR SCREENING; LDL GOAL LESS THAN 160    Major depressive disorder, recurrent episode, in partial remission (H24)    Anxiety    Environmental allergies      Diabetes: n/a  HbA1c:  No results found for: \"HGBA1C\"    Nutrition History:   Food allergies/intolerances/cultural or religous food customs: No     Weight loss history: Patient reports he has struggled with his weight majority of his life. Started working out ~2 years ago and lost weight to 170 lbs. Had weight gain d/t falling out of routine and exercise. Did the Keto diet (1 year) and Ramadan fasting for weight loss.  -snacks d/t boredom   - skips lunch    Vitamins/Mineral Supplementation: none    Dietary Recall:    Breakfast: protein bar (fit crunch, 16g protein)  Lunch: skips   Dinner: rice with chicken and small salad  Typical Snacks: banana or vegetables, some junk food " (cookies)  Overnight eating: No  Eating out: <1x/ week     Beverages: water,     Exercise: Goes to the gym 3x per week (weights and cardio for 1 hour - 90 min      Nutrition Diagnosis (PES statement):     Obesity related to nutrition knowledge deficit as evidence by patient subjective report, skipping meals and BMI of 33.8       Nutrition Intervention  Food and/or Nutrient Delivery   Placed emphasis on importance of developing a healthy meal routine, aiming for 3 meals a day and no snacks.  Discussed using a protein supplement as a meal replacement.  Nutrition Education   Discussed with patient how to build a meal: the importance of including a lean/low fat protein at each meal, include a source of vegetables at a minimum of lunch and dinner and limiting carbohydrate intake   Educated on sources of lean protein, portion sizes, the amount of grams found in each source. Recommend patient to aim for 20-30g protein at each meal.  Educated on how to read a food label: keeping total fat <10g and sugar <10g per serving.  Discussed the importance of adequate hydration, with emphasis on drinking 64oz of water or zero calorie beverages per day.      Goals established by patient:    protein per day (25-30g protein per meal)  Avoid skipping lunch   Boost fiber intake (30g fiber)   Stick to 3 meal pattern or 6 small meals per day.     Handouts provided:  [place] to success   High protein breakfast  Pro supps      Assessment/Plan:    Pt will follow up in 3 month(s) with bariatrician and 3 month(s) with dietitian.       Video-Visit Details    Type of service:  Video Visit    Video Start Time (time video started): 3:49 PM    Video End Time (time video stopped): 4:18 PM    Originating Location (pt. Location): Home      Distant Location (provider location):  Off-site    Mode of Communication:  Video Conference via [facility]    Physician has received verbal consent for a Video Visit from the patient? Yes      Gerri Cartagena  RD

## 2023-11-06 NOTE — LETTER
"    11/6/2023         RE: Marta Del Cid  [address] [place] [address]        Dear Colleague,    Thank you for referring your patient, Marta Del Cid, to the [facility] [place]. Please see a copy of my visit note below.    Marta Del Cid is a 18 year old who is being evaluated via a billable video visit.      How would you like to obtain your AVS? MyChart  If the video visit is dropped, the invitation should be resent by: Send to e-mail at: [email or web address]  Will anyone else be joining your video visit? No          Medical Weight Loss Initial Diet Evaluation  Assessment:  This patient was referred by Dr. Murcia for MNT as treatment for Obesity.   Marta is presenting today for a new weight management nutrition consultation. Pt has had an initial appointment with Dr. Murcia.    Weight loss medication:  none .     Personal Goals: overall weight loss     Anthropometrics:    Pt's weight is 213 lbs  Initial weight: 216 lbs  Weight change: 3 lbs down  BMI: 33.8  Ideal body weight: 66.1 kg (145 lb 11.6 oz)  Adjusted ideal body weight: 78.9 kg (173 lb 13.4 oz)  Estimated RMR ([place]-St Jeor equation):  1785 kcals x 1.2 (sedentary) = 2140 kcals (for weight maintenance)      Recommended Protein Intake:  grams of protein/day    Medical History:  Patient Active Problem List   Diagnosis     Myopia     CARDIOVASCULAR SCREENING; LDL GOAL LESS THAN 160     Major depressive disorder, recurrent episode, in partial remission (H24)     Anxiety     Environmental allergies      Diabetes: n/a  HbA1c:  No results found for: \"HGBA1C\"    Nutrition History:   Food allergies/intolerances/cultural or religous food customs: No     Weight loss history: Patient reports he has struggled with his weight majority of his life. Started working out ~2 years ago and lost weight to 170 lbs. Had weight gain d/t falling out of routine and exercise. Did the Keto diet (1 year) and Ramadan fasting for weight " loss.  -snacks d/t boredom   - skips lunch    Vitamins/Mineral Supplementation: none    Dietary Recall:    Breakfast: protein bar (fit crunch, 16g protein)  Lunch: skips   Dinner: rice with chicken and small salad  Typical Snacks: banana or vegetables, some junk food (cookies)  Overnight eating: No  Eating out: <1x/ week     Beverages: water,     Exercise: Goes to the gym 3x per week (weights and cardio for 1 hour - 90 min      Nutrition Diagnosis (PES statement):     Obesity related to nutrition knowledge deficit as evidence by patient subjective report, skipping meals and BMI of 33.8       Nutrition Intervention  Food and/or Nutrient Delivery   Placed emphasis on importance of developing a healthy meal routine, aiming for 3 meals a day and no snacks.  Discussed using a protein supplement as a meal replacement.  Nutrition Education   Discussed with patient how to build a meal: the importance of including a lean/low fat protein at each meal, include a source of vegetables at a minimum of lunch and dinner and limiting carbohydrate intake   Educated on sources of lean protein, portion sizes, the amount of grams found in each source. Recommend patient to aim for 20-30g protein at each meal.  Educated on how to read a food label: keeping total fat <10g and sugar <10g per serving.  Discussed the importance of adequate hydration, with emphasis on drinking 64oz of water or zero calorie beverages per day.      Goals established by patient:    protein per day (25-30g protein per meal)  Avoid skipping lunch   Boost fiber intake (30g fiber)   Stick to 3 meal pattern or 6 small meals per day.     Handouts provided:  [place] to success   High protein breakfast  Pro supps      Assessment/Plan:    Pt will follow up in 3 month(s) with bariatrician and 3 month(s) with dietitian.       Video-Visit Details    Type of service:  Video Visit    Video Start Time (time video started): 3:49 PM    Video End Time (time video stopped): 4:18  PM    Originating Location (pt. Location): Home      Distant Location (provider location):  Off-site    Mode of Communication:  Video Conference via [facility]    Physician has received verbal consent for a Video Visit from the patient? Yes      Gerri Cartagena RD        Again, thank you for allowing me to participate in the care of your patient.        Sincerely,        Gerri Cartagena RD

## 2023-12-09 ENCOUNTER — HEALTH MAINTENANCE LETTER (OUTPATIENT)
Age: 18
End: 2023-12-09

## 2023-12-26 ENCOUNTER — ALLIED HEALTH/NURSE VISIT (OUTPATIENT)
Dept: FAMILY MEDICINE | Facility: CLINIC | Age: 18
End: 2023-12-26
Payer: COMMERCIAL

## 2023-12-26 DIAGNOSIS — Z23 ENCOUNTER FOR IMMUNIZATION: Primary | ICD-10-CM

## 2023-12-26 PROCEDURE — 90471 IMMUNIZATION ADMIN: CPT

## 2023-12-26 PROCEDURE — 90651 9VHPV VACCINE 2/3 DOSE IM: CPT

## 2023-12-26 PROCEDURE — 99207 PR NO CHARGE NURSE ONLY: CPT

## 2024-02-08 ENCOUNTER — OFFICE VISIT (OUTPATIENT)
Dept: SURGERY | Facility: CLINIC | Age: 19
End: 2024-02-08
Payer: COMMERCIAL

## 2024-02-08 VITALS
BODY MASS INDEX: 32.96 KG/M2 | WEIGHT: 210 LBS | SYSTOLIC BLOOD PRESSURE: 110 MMHG | HEIGHT: 67 IN | DIASTOLIC BLOOD PRESSURE: 64 MMHG

## 2024-02-08 DIAGNOSIS — E66.9 OBESITY (BMI 30-39.9): Primary | ICD-10-CM

## 2024-02-08 DIAGNOSIS — F33.41 MAJOR DEPRESSIVE DISORDER, RECURRENT EPISODE, IN PARTIAL REMISSION (H): ICD-10-CM

## 2024-02-08 PROCEDURE — 99214 OFFICE O/P EST MOD 30 MIN: CPT | Performed by: FAMILY MEDICINE

## 2024-02-08 RX ORDER — PHENTERMINE HYDROCHLORIDE 37.5 MG/1
TABLET ORAL
Qty: 45 TABLET | Refills: 0 | Status: SHIPPED | OUTPATIENT
Start: 2024-02-08 | End: 2024-08-06

## 2024-02-08 NOTE — LETTER
2/8/2024         RE: Marta Del Cid  [address] [place] [address]        Dear Colleague,    Thank you for referring your patient, Marta Del Cid, to the [facility] SUR[facility]ERY [facility] AND BARIATRICS CARE [place]. Please see a copy of my visit note below.    Bariatric Care Clinic Non Surgical Follow up Visit   Date of visit: 2/8/2024  Physician: REA Murcia MD, MD  Primary Care is [facility].  Marta Del Cid   18 year old  male     Initial Weight: 216#    Today's Weight:   Wt Readings from Last 1 Encounters:   10/30/23 98 kg (216 lb)     There is no height or weight on file to calculate BMI.           Assessment and Plan   Assessment: Marta is a 18 year old year old male who presents for medical weight management.      Plan:    1. Obesity (BMI 30-39.9)  Patient was congratulated on his success thus far. Healthy habits to assist with further weight loss were discussed. He is trying to go the gym in the mornings before school. He is interested in trying medication and we decided to start phentermine 1/2 tab. He is aware that it could trigger his anxiety and will stop it if that happens.      2. Major depressive disorder, recurrent episode, in partial remission (H24)  This may improve with healthy habits and weight loss.      Follow up in 1 week with the dietician and in 3 weeks with myself           INTERIM HISTORY  Patient is working on healthy habits for weight loss. He continues to struggle with his depression, fatigue and motivation. He is currently seeing a psychiatrist and psychologist.     Goals established by patient with dietician 11/6/23:    protein per day (25-30g protein per meal)- met  Avoid skipping lunch - sometimes  Boost fiber intake (30g fiber)   Stick to 3 meal pattern or 6 small meals per day.     DIETARY HISTORY  Meals Per Day:2-3  Eating Protein First?: sometimes  Food Diary: B:protein shake or greek yogurt or protein bar L:hot pocket or salad or sandwich D: protein and vegetables  and starch (might be high calorie- lots of oils and butter)  Snacks Per Day: 2-3 per day  Typical Snack: fruit or vegetables or pretzels or chips  Fluid Intake: not sure  Portion Control: yes  Calorie Containing Beverages: diet soda, maybe 3 x per week  Meals at Restaurant per week:0-1    Positive Changes Since Last Visit: increased protein  Struggling With: depression, motivation    Knowledgeable in Reading Food Labels: yes  Getting Adequate Protein: working on it   Sleeping 7-8 hours/day not sure      PHYSICAL ACTIVITY PATTERNS:  Gym: 0-3 days per week (recently sick, fatigue)    REVIEW OF SYSTEMS  GENERAL/CONSTITUTIONAL:  Fatigue: yes  HEENT:   glaucoma: no  CARDIOVASCULAR:  History of heart disease: no  GI:  Pancreatitis: no  NEUROLOGIC:  Paresthesias: no  History of migraine headaches: history of  PSYCHIATRIC:  Moods: depression and anxiety, anxiety fairly well controlled at this time, no recent panic attacks  ENDOCRINE:  Monitoring Blood Sugars: no  Sugars Well Controlled: na  No personal or family history of medullary thyroid cancer no  :  Birth control: male  History of kidney stones: no     Patient Profile   Social History     Social History Narrative     Not on file        Past Medical History   Past Medical History:   Diagnosis Date     Anxiety      CARDIOVASCULAR SCREENING; LDL GOAL LESS THAN 160      Environmental allergies      Major depressive disorder, recurrent episode, in partial remission (H24)      Patient Active Problem List   Diagnosis     Myopia     CARDIOVASCULAR SCREENING; LDL GOAL LESS THAN 160     Major depressive disorder, recurrent episode, in partial remission (H24)     Anxiety     Environmental allergies       Past Surgical History  He has a past surgical history that includes Dental surgery (2021).     Examination   There were no vitals taken for this visit.  Wt Readings from Last 4 Encounters:   10/30/23 98 kg (216 lb)   10/23/23 98.1 kg (216 lb 3.2 oz)   08/10/21 77.8 kg (171 lb  9.6 oz) (80%, Z= 0.85)*   09/04/19 89.7 kg (197 lb 12.8 oz) (97%, Z= 1.93)*     * Growth percentiles are based on [facility] (Boys, 2-20 Years) data.      BP Readings from Last 3 Encounters:   10/23/23 128/62   08/10/21 108/64 (20%, Z = -0.84 /  37%, Z = -0.33)*   09/04/19 116/73 (62%, Z = 0.31 /  78%, Z = 0.77)*     *BP percentiles are based on the 2017 AAP Clinical Practice Guideline for boys          GEN: Alert and oriented in no acute distress.   HEENT: mucous membranes moist  CV: RRR no MRG       Counseling:   We reviewed the important post op bariatric recommendations:  -eating 3 meals daily  -eating protein first, getting >60gm protein daily  -eating slowly, chewing food well  -avoiding/limiting calorie containing beverages  -limiting starchy vegetables and carbohydrates, choosing wheat, not white with breads,   crackers, pastas, peyton, bagels, tortillas, rice  -limiting restaurant or cafeteria eating to twice a week or less    We discussed the importance of restorative sleep and stress management in maintaining a healthy weight.  We discussed the National Weight Control Registry healthy weight maintenance strategies and ways to optimize metabolism.  We discussed the importance of physical activity including cardiovascular and strength training in maintaining a healthier weight.    Total time spent on the date of this encounter doing: chart review, review of test results, patient visit, physical exam, education, counseling, developing plan of care and documenting = 33 minutes.         REA Murcia MD  [facility]               Again, thank you for allowing me to participate in the care of your patient.        Sincerely,        REA Murcia MD

## 2024-02-08 NOTE — PATIENT INSTRUCTIONS

## 2024-02-08 NOTE — PROGRESS NOTES
Bariatric Care Clinic Non Surgical Follow up Visit   Date of visit: 2/8/2024  Physician: REA Murcia MD, MD  Primary Care is [facility].  Marta A Iesa   18 year old  male     Initial Weight: 216#    Today's Weight:   Wt Readings from Last 1 Encounters:   10/30/23 98 kg (216 lb)     There is no height or weight on file to calculate BMI.           Assessment and Plan   Assessment: Marta is a 18 year old year old male who presents for medical weight management.      Plan:    1. Obesity (BMI 30-39.9)  Patient was congratulated on his success thus far. Healthy habits to assist with further weight loss were discussed. He is trying to go the gym in the mornings before school. He is interested in trying medication and we decided to start phentermine 1/2 tab. He is aware that it could trigger his anxiety and will stop it if that happens.      2. Major depressive disorder, recurrent episode, in partial remission (H24)  This may improve with healthy habits and weight loss.      Follow up in 1 week with the dietician and in 3 weeks with myself           INTERIM HISTORY  Patient is working on healthy habits for weight loss. He continues to struggle with his depression, fatigue and motivation. He is currently seeing a psychiatrist and psychologist.     Goals established by patient with dietician 11/6/23:    protein per day (25-30g protein per meal)- met  Avoid skipping lunch - sometimes  Boost fiber intake (30g fiber)   Stick to 3 meal pattern or 6 small meals per day.     DIETARY HISTORY  Meals Per Day:2-3  Eating Protein First?: sometimes  Food Diary: B:protein shake or greek yogurt or protein bar L:hot pocket or salad or sandwich D: protein and vegetables and starch (might be high calorie- lots of oils and butter)  Snacks Per Day: 2-3 per day  Typical Snack: fruit or vegetables or pretzels or chips  Fluid Intake: not sure  Portion Control: yes  Calorie Containing Beverages: diet soda, maybe 3 x per week  Meals at  Restaurant per week:0-1    Positive Changes Since Last Visit: increased protein  Struggling With: depression, motivation    Knowledgeable in Reading Food Labels: yes  Getting Adequate Protein: working on it   Sleeping 7-8 hours/day not sure      PHYSICAL ACTIVITY PATTERNS:  Gym: 0-3 days per week (recently sick, fatigue)    REVIEW OF SYSTEMS  GENERAL/CONSTITUTIONAL:  Fatigue: yes  HEENT:   glaucoma: no  CARDIOVASCULAR:  History of heart disease: no  GI:  Pancreatitis: no  NEUROLOGIC:  Paresthesias: no  History of migraine headaches: history of  PSYCHIATRIC:  Moods: depression and anxiety, anxiety fairly well controlled at this time, no recent panic attacks  ENDOCRINE:  Monitoring Blood Sugars: no  Sugars Well Controlled: na  No personal or family history of medullary thyroid cancer no  :  Birth control: male  History of kidney stones: no     Patient Profile   Social History     Social History Narrative    Not on file        Past Medical History   Past Medical History:   Diagnosis Date    Anxiety     CARDIOVASCULAR SCREENING; LDL GOAL LESS THAN 160     Environmental allergies     Major depressive disorder, recurrent episode, in partial remission (H24)      Patient Active Problem List   Diagnosis    Myopia    CARDIOVASCULAR SCREENING; LDL GOAL LESS THAN 160    Major depressive disorder, recurrent episode, in partial remission (H24)    Anxiety    Environmental allergies       Past Surgical History  He has a past surgical history that includes Dental surgery (2021).     Examination   There were no vitals taken for this visit.  Wt Readings from Last 4 Encounters:   10/30/23 98 kg (216 lb)   10/23/23 98.1 kg (216 lb 3.2 oz)   08/10/21 77.8 kg (171 lb 9.6 oz) (80%, Z= 0.85)*   09/04/19 89.7 kg (197 lb 12.8 oz) (97%, Z= 1.93)*     * Growth percentiles are based on CDC (Boys, 2-20 Years) data.      BP Readings from Last 3 Encounters:   10/23/23 128/62   08/10/21 108/64 (20%, Z = -0.84 /  37%, Z = -0.33)*   09/04/19 116/73  (62%, Z = 0.31 /  78%, Z = 0.77)*     *BP percentiles are based on the 2017 AAP Clinical Practice Guideline for boys          GEN: Alert and oriented in no acute distress.   HEENT: mucous membranes moist  CV: RRR no MRG       Counseling:   We reviewed the important post op bariatric recommendations:  -eating 3 meals daily  -eating protein first, getting >60gm protein daily  -eating slowly, chewing food well  -avoiding/limiting calorie containing beverages  -limiting starchy vegetables and carbohydrates, choosing wheat, not white with breads,   crackers, pastas, peyton, bagels, tortillas, rice  -limiting restaurant or cafeteria eating to twice a week or less    We discussed the importance of restorative sleep and stress management in maintaining a healthy weight.  We discussed the National Weight Control Registry healthy weight maintenance strategies and ways to optimize metabolism.  We discussed the importance of physical activity including cardiovascular and strength training in maintaining a healthier weight.    Total time spent on the date of this encounter doing: chart review, review of test results, patient visit, physical exam, education, counseling, developing plan of care and documenting = 33 minutes.         REA Murcia MD  [facility]

## 2024-02-12 ENCOUNTER — VIRTUAL VISIT (OUTPATIENT)
Dept: SURGERY | Facility: CLINIC | Age: 19
End: 2024-02-12
Payer: COMMERCIAL

## 2024-02-12 DIAGNOSIS — E66.9 OBESITY (BMI 30-39.9): Primary | ICD-10-CM

## 2024-02-12 DIAGNOSIS — Z71.3 NUTRITIONAL COUNSELING: ICD-10-CM

## 2024-02-12 PROCEDURE — 97803 MED NUTRITION INDIV SUBSEQ: CPT | Mod: 95

## 2024-02-12 NOTE — PROGRESS NOTES
Marta Del Cid is a 18 year old who is being evaluated via a billable video visit.      How would you like to obtain your AVS? MyChart  If the video visit is dropped, the invitation should be resent by: Send to e-mail at: [email or web address]  Will anyone else be joining your video visit? No      Medical  Weight Loss Follow-Up Diet Evaluation  Assessment:  Marta is presenting today for a follow up weight management nutrition consultation.  This patient has had an initial appointment and was referred by Dr. Murcia for MNT as treatment for Obesity.  Weight loss medication: Phentermine.   Pt's weight is 206 lbs  Initial weight: 216 lbs  Weight change: 10 lbs down         2/1/2024     5:46 PM   Changes and Difficulties   I have made the following changes to my diet since my last visit: Ate around 1700 calories a day   With regards to my diet, I am still struggling with: Not being hungry around 9pm   I have made the following changes to my activity/exercise since my last visit: trying new workout machines and doing hard cardio   With regards to my activity/exercise, I am still struggling with: Going consistently due to fatigue/injury/sickness     BMI: There is no height or weight on file to calculate BMI.  Ideal body weight: 66.1 kg (145 lb 11.6 oz)  Adjusted ideal body weight: 77.8 kg (171 lb 6.9 oz)    Estimated RMR ([place]-St Jeor equation):   1785 kcals x 1.2 (sedentary) = 2140 kcals (for weight maintenance)   Recommended Protein Intake:  grams of protein/day  Patient Active Problem List:  Patient Active Problem List   Diagnosis    Myopia    CARDIOVASCULAR SCREENING; LDL GOAL LESS THAN 160    Major depressive disorder, recurrent episode, in partial remission (H24)    Anxiety    Environmental allergies       Progress on goals from last visit: Patient reports he is doing well with implementing goals. Is down 10 lbs from starting weight. Meeting protein goals via meals and supplements. Portion sizes are better  controled at dinner. Noted he is hunger in the evening time (11pm), however he eats dinner fairly early (4pm).  - trying protein pasta   -B12 and fatigue?     protein per day (25-30g protein per meal) - met   Avoid skipping lunch - met   Boost fiber intake (30g fiber) - met  Stick to 3 meal pattern or 6 small meals per day. - met       Dietary Recall:  Breakfast 8am: greek yogurt OR cottage cheese on whole wheat bread   Lunch 10-12: protein bar (Fitcrunch, pure protein) Or protein shake ([employer]life)  Dinner 4pm: protein (chicken, beef), carb (rice, protein pasta, potatoes) and 1/2 plate of salad  Typical Snacks:  banana or vegetables, some junk food (cookies), greek yogurt   Overnight eating: No  Eating out: <1x/ week   Beverages:   Water 6-8 cups of water   Diet soda 2-3 cans per week      Exercise: Goes to the gym 3x per week (weights and cardio for 1 hour - 90 min      Nutrition Diagnosis:    Obesity related to nutrition knowledge deficit as evidence by patient subjective report, skipping meals and BMI of 32.8          Intervention:  Food and/or nutrient delivery: encouraged patient for meals to range every 4-6 hours.   Nutrition education: discussed benefits from low fat/non fat dairy products in regards to calorie content   Nutrition counseling: congratulated patient on success and continued goal setting.      Monitoring/Evaluation:    Goals:  Choosing low fat/fat free dairy products  Have dinner 5:30 pm mind afternoon small snack at 3pm  Follow 10/10 rule       Patient to follow up in 4 month(s) with bariatrician and 4 month(s) with RD        Video-Visit Details    Type of service:  Video Visit    Video Start Time (time video started): 3:01 PM    Video End Time (time video stopped): 3:21 PM    Originating Location (pt. Location): Home      Distant Location (provider location):  Off-site    Mode of Communication:  Video Conference via [employer]    Physician has received verbal consent for a Video Visit  from the patient? Yes      Gerri Cartagena RD

## 2024-02-12 NOTE — LETTER
2/12/2024         RE: Marta Del Cid  [address] [place] [address]        Dear Colleague,    Thank you for referring your patient, Marta Del Cid, to the [facility] SURGERY [facility] AND BARIATRICS CARE [place]. Please see a copy of my visit note below.    Marta Del Cid is a 18 year old who is being evaluated via a billable video visit.      How would you like to obtain your AVS? MyChart  If the video visit is dropped, the invitation should be resent by: Send to e-mail at: [email or web address]  Will anyone else be joining your video visit? No      Medical  Weight Loss Follow-Up Diet Evaluation  Assessment:  Marta is presenting today for a follow up weight management nutrition consultation.  This patient has had an initial appointment and was referred by Dr. Murcia for MNT as treatment for Obesity.  Weight loss medication: Phentermine.   Pt's weight is 206 lbs  Initial weight: 216 lbs  Weight change: 10 lbs down         2/1/2024     5:46 PM   Changes and Difficulties   I have made the following changes to my diet since my last visit: Ate around 1700 calories a day   With regards to my diet, I am still struggling with: Not being hungry around 9pm   I have made the following changes to my activity/exercise since my last visit: trying new workout machines and doing hard cardio   With regards to my activity/exercise, I am still struggling with: Going consistently due to fatigue/injury/sickness     BMI: There is no height or weight on file to calculate BMI.  Ideal body weight: 66.1 kg (145 lb 11.6 oz)  Adjusted ideal body weight: 77.8 kg (171 lb 6.9 oz)    Estimated RMR ([place]-St Jeor equation):   1785 kcals x 1.2 (sedentary) = 2140 kcals (for weight maintenance)   Recommended Protein Intake:  grams of protein/day  Patient Active Problem List:  Patient Active Problem List   Diagnosis     Myopia     CARDIOVASCULAR SCREENING; LDL GOAL LESS THAN 160     Major depressive disorder, recurrent episode, in partial  remission (H24)     Anxiety     Environmental allergies       Progress on goals from last visit: Patient reports he is doing well with implementing goals. Is down 10 lbs from starting weight. Meeting protein goals via meals and supplements. Portion sizes are better controled at dinner. Noted he is hunger in the evening time (11pm), however he eats dinner fairly early (4pm).  - trying protein pasta   -B12 and fatigue?     protein per day (25-30g protein per meal) - met   Avoid skipping lunch - met   Boost fiber intake (30g fiber) - met  Stick to 3 meal pattern or 6 small meals per day. - met       Dietary Recall:  Breakfast 8am: greek yogurt OR cottage cheese on whole wheat bread   Lunch 10-12: protein bar (Fitcrunch, pure protein) Or protein shake ([employer]life)  Dinner 4pm: protein (chicken, beef), carb (rice, protein pasta, potatoes) and 1/2 plate of salad  Typical Snacks:  banana or vegetables, some junk food (cookies), greek yogurt   Overnight eating: No  Eating out: <1x/ week   Beverages:   Water 6-8 cups of water   Diet soda 2-3 cans per week      Exercise: Goes to the gym 3x per week (weights and cardio for 1 hour - 90 min      Nutrition Diagnosis:    Obesity related to nutrition knowledge deficit as evidence by patient subjective report, skipping meals and BMI of 32.8          Intervention:  Food and/or nutrient delivery: encouraged patient for meals to range every 4-6 hours.   Nutrition education: discussed benefits from low fat/non fat dairy products in regards to calorie content   Nutrition counseling: congratulated patient on success and continued goal setting.      Monitoring/Evaluation:    Goals:  Choosing low fat/fat free dairy products  Have dinner 5:30 pm mind afternoon small snack at 3pm  Follow 10/10 rule       Patient to follow up in 4 month(s) with bariatrician and 4 month(s) with RD        Video-Visit Details    Type of service:  Video Visit    Video Start Time (time video started): 3:01  PM    Video End Time (time video stopped): 3:21 PM    Originating Location (pt. Location): Home      Distant Location (provider location):  Off-site    Mode of Communication:  Video Conference via [facility]    Physician has received verbal consent for a Video Visit from the patient? Yes      Gerri Cartagena RD           Again, thank you for allowing me to participate in the care of your patient.        Sincerely,        Gerri Cartagena RD

## 2024-02-12 NOTE — PATIENT INSTRUCTIONS
150 Calories or Less Snack Ideas   1 hardboiled egg with   cup berries  1 small apple with 1 hardboiled egg  10 almonds with   cup berries  2 clementines with 1 light string cheese  1 light string cheese with   sliced apple  1 light string cheese wrapped in 2 slices of turkey  5 100% whole wheat crackers (e.g. Triscuit) with 1 light string cheese    c. cottage cheese with   cup fruit and 1 Tbsp sunflower seeds     cup cottage cheese with   of an avocado     can tuna fish with 1 cup sliced cucumbers   2 oz turkey slices with 1 cup carrots  1 container (6 oz) of low sugar (less than 10 grams of sugar) greek yogurt   3 Tablespoons of hummus with 1 cup sliced bell peppers, carrots or vegetable of your choice  4 Tablespoons ranch dip made with plain Greek Yogurt and 3 mini cucumbers  1/4 cup nuts (any kind)  1 Tablespoon peanut butter with 1 stalk celery   1 dill pickle wrapped in 1-2 slices of deli ham with 1 tsp of light cream cheese  5 100% whole wheat crackers (e.g. Triscuit) with 1 tsp each of guacamole/avocado topped with a cherry tomato - season with pepper or Everything Bagel Seasoning

## 2024-04-30 ENCOUNTER — ALLIED HEALTH/NURSE VISIT (OUTPATIENT)
Dept: FAMILY MEDICINE | Facility: CLINIC | Age: 19
End: 2024-04-30
Payer: COMMERCIAL

## 2024-04-30 DIAGNOSIS — Z23 ENCOUNTER FOR IMMUNIZATION: Primary | ICD-10-CM

## 2024-04-30 PROCEDURE — 90651 9VHPV VACCINE 2/3 DOSE IM: CPT

## 2024-04-30 PROCEDURE — 90471 IMMUNIZATION ADMIN: CPT

## 2024-04-30 PROCEDURE — 99207 PR NO CHARGE NURSE ONLY: CPT

## 2024-04-30 NOTE — PROGRESS NOTES
Prior to immunization administration, verified patients identity using patient s name and date of birth. Please see Immunization Activity for additional information.     Screening Questionnaire for Adult Immunization    Are you sick today?   No   Do you have allergies to medications, food, a vaccine component or latex?   No   Have you ever had a serious reaction after receiving a vaccination?   No   Do you have a long-term health problem with heart, lung, kidney, or metabolic disease (e.g., diabetes), asthma, a blood disorder, no spleen, complement component deficiency, a cochlear implant, or a spinal fluid leak?  Are you on long-term aspirin therapy?   No   Do you have cancer, leukemia, HIV/AIDS, or any other immune system problem?   No   Do you have a parent, brother, or sister with an immune system problem?   No   In the past 3 months, have you taken medications that affect  your immune system, such as prednisone, other steroids, or anticancer drugs; drugs for the treatment of rheumatoid arthritis, Crohn s disease, or psoriasis; or have you had radiation treatments?   No   Have you had a seizure, or a brain or other nervous system problem?   No   During the past year, have you received a transfusion of blood or blood    products, or been given immune (gamma) globulin or antiviral drug?   No   For women: Are you pregnant or is there a chance you could become       pregnant during the next month?   No   Have you received any vaccinations in the past 4 weeks?   No     Immunization questionnaire answers were all negative.    I have reviewed the following standing orders:   This patient is due and qualifies for the HPV vaccine.    Click here for HPV (Adult 15-45Y) Standing Order     I have reviewed the vaccines inclusion and exclusion criteria;No concerns regarding eligibility.       Patient instructed to remain in clinic for 15 minutes afterwards, and to report any adverse reactions.     Screening performed by Heather  MIRIAM Brown on 4/30/2024 at 3:49 PM.

## 2024-05-14 ENCOUNTER — OFFICE VISIT (OUTPATIENT)
Dept: FAMILY MEDICINE | Facility: CLINIC | Age: 19
End: 2024-05-14
Payer: COMMERCIAL

## 2024-05-14 VITALS
RESPIRATION RATE: 16 BRPM | DIASTOLIC BLOOD PRESSURE: 76 MMHG | BODY MASS INDEX: 32.8 KG/M2 | HEART RATE: 95 BPM | TEMPERATURE: 98.5 F | SYSTOLIC BLOOD PRESSURE: 122 MMHG | HEIGHT: 67 IN | WEIGHT: 209 LBS | OXYGEN SATURATION: 98 %

## 2024-05-14 DIAGNOSIS — S76.012A STRAIN OF LEFT HIP ADDUCTOR MUSCLE, INITIAL ENCOUNTER: ICD-10-CM

## 2024-05-14 DIAGNOSIS — F33.41 MAJOR DEPRESSIVE DISORDER, RECURRENT EPISODE, IN PARTIAL REMISSION (H): ICD-10-CM

## 2024-05-14 DIAGNOSIS — M79.652 PAIN OF LEFT THIGH: Primary | ICD-10-CM

## 2024-05-14 DIAGNOSIS — Z51.81 MEDICATION MONITORING ENCOUNTER: ICD-10-CM

## 2024-05-14 DIAGNOSIS — F41.9 ANXIETY: ICD-10-CM

## 2024-05-14 PROCEDURE — 99214 OFFICE O/P EST MOD 30 MIN: CPT | Performed by: FAMILY MEDICINE

## 2024-05-14 RX ORDER — TRETINOIN 0.1 %
CREAM (GRAM) TOPICAL
COMMUNITY
Start: 2024-03-21

## 2024-05-14 RX ORDER — TRIAMCINOLONE ACETONIDE 1 MG/G
CREAM TOPICAL
COMMUNITY
Start: 2024-04-24

## 2024-05-14 RX ORDER — CLINDAMYCIN PHOSPHATE 10 UG/ML
LOTION TOPICAL
COMMUNITY
Start: 2024-03-20

## 2024-05-14 RX ORDER — DOXYCYCLINE 100 MG/1
100 CAPSULE ORAL 2 TIMES DAILY
COMMUNITY
Start: 2024-04-23

## 2024-05-14 ASSESSMENT — ANXIETY QUESTIONNAIRES
6. BECOMING EASILY ANNOYED OR IRRITABLE: NOT AT ALL
3. WORRYING TOO MUCH ABOUT DIFFERENT THINGS: NEARLY EVERY DAY
GAD7 TOTAL SCORE: 4
2. NOT BEING ABLE TO STOP OR CONTROL WORRYING: NOT AT ALL
GAD7 TOTAL SCORE: 4
7. FEELING AFRAID AS IF SOMETHING AWFUL MIGHT HAPPEN: NOT AT ALL
1. FEELING NERVOUS, ANXIOUS, OR ON EDGE: SEVERAL DAYS
GAD7 TOTAL SCORE: 4
8. IF YOU CHECKED OFF ANY PROBLEMS, HOW DIFFICULT HAVE THESE MADE IT FOR YOU TO DO YOUR WORK, TAKE CARE OF THINGS AT HOME, OR GET ALONG WITH OTHER PEOPLE?: SOMEWHAT DIFFICULT
IF YOU CHECKED OFF ANY PROBLEMS ON THIS QUESTIONNAIRE, HOW DIFFICULT HAVE THESE PROBLEMS MADE IT FOR YOU TO DO YOUR WORK, TAKE CARE OF THINGS AT HOME, OR GET ALONG WITH OTHER PEOPLE: SOMEWHAT DIFFICULT
4. TROUBLE RELAXING: NOT AT ALL
5. BEING SO RESTLESS THAT IT IS HARD TO SIT STILL: NOT AT ALL
7. FEELING AFRAID AS IF SOMETHING AWFUL MIGHT HAPPEN: NOT AT ALL

## 2024-05-14 ASSESSMENT — PATIENT HEALTH QUESTIONNAIRE - PHQ9
SUM OF ALL RESPONSES TO PHQ QUESTIONS 1-9: 12
10. IF YOU CHECKED OFF ANY PROBLEMS, HOW DIFFICULT HAVE THESE PROBLEMS MADE IT FOR YOU TO DO YOUR WORK, TAKE CARE OF THINGS AT HOME, OR GET ALONG WITH OTHER PEOPLE: SOMEWHAT DIFFICULT
SUM OF ALL RESPONSES TO PHQ QUESTIONS 1-9: 12

## 2024-05-14 NOTE — PROGRESS NOTES
"  Assessment & Plan     Pain of left thigh    - Physical Therapy  Referral    Strain of left hip adductor muscle, initial encounter    - Physical Therapy  Referral    Major depressive disorder, recurrent episode, in partial remission (H24)    - sertraline (ZOLOFT) 50 MG tablet  Dispense: 90 tablet; Refill: 3    Anxiety    - sertraline (ZOLOFT) 50 MG tablet  Dispense: 90 tablet; Refill: 3    Medication monitoring encounter      Ordering of each unique test    20 minutes spent by me on the date of the encounter doing chart review, history and exam, documentation and further activities per the note    BMI  Estimated body mass index is 32.73 kg/m  as calculated from the following:    Height as of this encounter: 1.702 m (5' 7\").    Weight as of this encounter: 94.8 kg (209 lb).     Weight management plan: diet and exercise      Work on weight loss  Regular exercise    Plan:    1) Medications: increase sertaline from 50 to 100 mg    2) Labs: NA    3) Immunizations: NA    4) Imaging/Diagnostics: consider    5) Consults: PT, consider FSOC    Jean Lozano is a 18 year old, presenting for the following health issues:  Musculoskeletal Problem        5/14/2024     8:21 AM   Additional Questions   Roomed by Jumana FELTON CMA     History of Present Illness       Reason for visit:  Pain in leg  Symptom onset:  More than a month  Symptoms include:  If it bends a certain way it kind of hurts  Symptom intensity:  Mild  Symptom progression:  Improving  Had these symptoms before:  No  What makes it worse:  Not to sure  What makes it better:  Stretching it or massaging it temporarly stops it    He eats 2-3 servings of fruits and vegetables daily.He consumes 0 sweetened beverage(s) daily.He exercises with enough effort to increase his heart rate 60 or more minutes per day.  He exercises with enough effort to increase his heart rate 3 or less days per week.   He is taking medications regularly.     Patient states it " started in December. Patient started to work out, unsure if this is the cause-did not do anything different. Only when bent does it hurt-only happens occasionally is not constant.     Since 12/2023, when doing stretching exercises / ROM, inner thigh pain, can be dull/sharp, can be with activity, better with certain motions, no issues with sleep/rest, able to walk without issues, tried massage, hip abductors, stretching, short term help, has not tried heat / ice, no use of tylenol/NSAID's as not that bad, has been avoiding leg work outs    Depression / Anxiety - wants to increase dose - seeing counselor        8/10/2021     1:28 PM 10/23/2023     9:41 AM 5/14/2024     8:18 AM   PHQ   PHQ-9 Total Score  14 12   Q9: Thoughts of better off dead/self-harm past 2 weeks  Not at all Not at all   PHQ-A Total Score 4     PHQ-A Depressed most days in past year Yes     PHQ-A Mood affect on daily activities Not difficult at all     PHQ-A Suicide Ideation past 2 weeks Not at all     PHQ-A Suicide Ideation past month No     PHQ-A Previous suicide attempt No             10/23/2023     9:41 AM 5/14/2024     8:19 AM   GAURAV-7 SCORE   Total Score 3 (minimal anxiety) 4 (minimal anxiety)   Total Score 3 4           Patient Active Problem List   Diagnosis    Myopia    CARDIOVASCULAR SCREENING; LDL GOAL LESS THAN 160    Major depressive disorder, recurrent episode, in partial remission (H24)    Anxiety    Environmental allergies       Past Medical History:   Diagnosis Date    Anxiety     CARDIOVASCULAR SCREENING; LDL GOAL LESS THAN 160     Environmental allergies     Major depressive disorder, recurrent episode, in partial remission (H24)        Past Surgical History:   Procedure Laterality Date    DENTAL SURGERY  2021       Current Outpatient Medications   Medication Sig Dispense Refill    clindamycin (CLEOCIN T) 1 % external lotion APPLY TOPICALLY TO FACE EVERY MORNING      doxycycline monohydrate (MONODOX) 100 MG capsule Take 100 mg by  mouth 2 times daily      hydrOXYzine (ATARAX) 10 MG tablet Take 1 tablet (10 mg) by mouth every 4 hours as needed for anxiety 30 tablet 1    IBUPROFEN as needed      phentermine (ADIPEX-P) 37.5 MG tablet Take 1/2 tablet every morning with breakfast. 45 tablet 0    RETIN-A 0.1 % external cream APPLY TO THE AFFECTED AREA OF ACNE ON FACE EVERY NIGHT AT BEDTIME      sertraline (ZOLOFT) 50 MG tablet Take 2 tablets (100 mg) by mouth daily 90 tablet 3    triamcinolone (KENALOG) 0.1 % external cream APPLY TO ECZEMA AREAS ON BACK OF NECK TWICE DAILY AT NIGHT TIME.         Allergies   Allergen Reactions    Seasonal Allergies        Family History   Problem Relation Age of Onset    Family History Negative Mother     Other - See Comments Sister         non binary    Other - See Comments Sister         transgender to male    Hypertension Maternal Grandfather     Diabetes Maternal Grandfather        Social History     Socioeconomic History    Marital status: Single     Spouse name: None    Number of children: 0    Years of education: None    Highest education level: None   Tobacco Use    Smoking status: Never    Smokeless tobacco: Never   Vaping Use    Vaping status: Never Used   Substance and Sexual Activity    Alcohol use: No     Alcohol/week: 0.0 standard drinks of alcohol    Drug use: No    Sexual activity: Never     Social Determinants of Health     Financial Resource Strain: Low Risk  (10/23/2023)    Financial Resource Strain     Within the past 12 months, have you or your family members you live with been unable to get utilities (heat, electricity) when it was really needed?: No   Food Insecurity: Low Risk  (10/23/2023)    Food Insecurity     Within the past 12 months, did you worry that your food would run out before you got money to buy more?: No     Within the past 12 months, did the food you bought just not last and you didn t have money to get more?: No   Transportation Needs: Low Risk  (10/23/2023)    Transportation  "Needs     Within the past 12 months, has lack of transportation kept you from medical appointments, getting your medicines, non-medical meetings or appointments, work, or from getting things that you need?: No   Interpersonal Safety: Low Risk  (5/14/2024)    Interpersonal Safety     Do you feel physically and emotionally safe where you currently live?: Yes     Within the past 12 months, have you been hit, slapped, kicked or otherwise physically hurt by someone?: No     Within the past 12 months, have you been humiliated or emotionally abused in other ways by your partner or ex-partner?: No   Housing Stability: Low Risk  (10/23/2023)    Housing Stability     Do you have housing? : Yes     Are you worried about losing your housing?: No       Review of Systems  CONSTITUTIONAL: NEGATIVE for fever, chills, change in weight  INTEGUMENTARY/SKIN: NEGATIVE for worrisome rashes, moles or lesions  EYES: NEGATIVE for vision changes or irritation  ENT/MOUTH: NEGATIVE for ear, mouth and throat problems  RESP: NEGATIVE for significant cough or SOB  CV: NEGATIVE for chest pain, palpitations or peripheral edema  GI: NEGATIVE for nausea, abdominal pain, heartburn, or change in bowel habits  : NEGATIVE for frequency, dysuria, or hematuria  MUSCULOSKELETAL: NEGATIVE for significant arthralgias or myalgia  NEURO: NEGATIVE for weakness, dizziness or paresthesias  ENDOCRINE: NEGATIVE for temperature intolerance, skin/hair changes  HEME: NEGATIVE for bleeding problems  PSYCHIATRIC: NEGATIVE for changes in mood or affect      Objective    /76   Pulse 95   Temp 98.5  F (36.9  C) (Tympanic)   Resp 16   Ht 1.702 m (5' 7\")   Wt 94.8 kg (209 lb)   SpO2 98%   BMI 32.73 kg/m    Body mass index is 32.73 kg/m .    Physical Exam   GENERAL: alert and no distress  EYES: Eyes grossly normal to inspection, PERRL and conjunctivae and sclerae normal  HENT: ear canals and TM's normal, nose and mouth without ulcers or lesions  NECK: no " adenopathy, no asymmetry, masses, or scars  RESP: lungs clear to auscultation - no rales, rhonchi or wheezes  CV: regular rate and rhythm, normal S1 S2, no S3 or S4, no murmur, click or rub, no peripheral edema  ABDOMEN: soft, nontender, no hepatosplenomegaly, no masses and bowel sounds normal  MS: extremities normal- no gross deformities noted  SKIN: no suspicious lesions or rashes  NEURO: Normal strength and tone, mentation intact and speech normal  PSYCH: mentation appears normal, affect normal/bright      Signed Electronically by:              Marshall Mansfield MD, FAAFP     [facility]  [address] [place] [id number]  [email or web address]   Office: [phone]  Fax: [phone]

## 2024-05-21 ENCOUNTER — THERAPY VISIT (OUTPATIENT)
Dept: PHYSICAL THERAPY | Facility: CLINIC | Age: 19
End: 2024-05-21
Attending: FAMILY MEDICINE
Payer: COMMERCIAL

## 2024-05-21 DIAGNOSIS — M79.652 PAIN OF LEFT THIGH: ICD-10-CM

## 2024-05-21 DIAGNOSIS — S76.012A STRAIN OF LEFT HIP ADDUCTOR MUSCLE, INITIAL ENCOUNTER: ICD-10-CM

## 2024-05-21 PROCEDURE — 97110 THERAPEUTIC EXERCISES: CPT | Mod: GP | Performed by: PHYSICAL THERAPIST

## 2024-05-21 PROCEDURE — 97161 PT EVAL LOW COMPLEX 20 MIN: CPT | Mod: GP | Performed by: PHYSICAL THERAPIST

## 2024-05-21 ASSESSMENT — ACTIVITIES OF DAILY LIVING (ADL)
DEEP_SQUATTING: MODERATE DIFFICULTY
PLEASE_INDICATE_YOR_PRIMARY_REASON_FOR_REFERRAL_TO_THERAPY:: HIP
ABILITY_TO_PARTICIPATE_IN_YOUR_DESIRED_SPORT_AS_LONG_AS_YOU_WOULD_LIKE: SLIGHT DIFFICULTY
ADL_COUNT: 17
SPORTS_HIGHEST_POTENTIAL_SCORE: 32
SPORTS_SCORE(%): INCOMPLETE
ABILITY_TO_PERFORM_ACTIVITY_WITH_YOUR_NORMAL_TECHNIQUE: EXTREME DIFFICULTY
HOS_ADL_HIGHEST_POTENTIAL_SCORE: 8
DEEP SQUATTING: MODERATE DIFFICULTY
HOW_WOULD_YOU_RATE_YOUR_CURRENT_LEVEL_OF_FUNCTION_DURING_YOUR_USUAL_ACTIVITIES_OF_DAILY_LIVING_FROM_0_TO_100_WITH_100_BEING_YOUR_LEVEL_OF_FUNCTION_PRIOR_TO_YOUR_HIP_PROBLEM_AND_0_BEING_THE_INABILITY_TO_PERFORM_ANY_OF_YOUR_USUAL_DAILY_ACTIVITIES?: 100
ADL_TOTAL_ITEM_SCORE: 0
HOS_ADL_ITEM_SCORE_TOTAL: 5
SPORTS_COUNT: 8
TWISTING/PIVOTING_ON_INVOLVED_LEG: SLIGHT DIFFICULTY
SPORTS_TOTAL_ITEM_SCORE: INCOMPLETE
ADL_HIGHEST_POTENTIAL_SCORE: 68
TWISTING/PIVOTING ON INVOLVED LEG: SLIGHT DIFFICULTY
HOW_WOULD_YOU_RATE_YOUR_CURRENT_LEVEL_OF_FUNCTION?: ABNORMAL
ADL_SCORE(%): 0
HOW_WOULD_YOU_RATE_YOUR_CURRENT_LEVEL_OF_FUNCTION_DURING_YOUR_USUAL_ACTIVITIES_OF_DAILY_LIVING_FROM_0_TO_100_WITH_100_BEING_YOUR_LEVEL_OF_FUNCTION_PRIOR_TO_YOUR_HIP_PROBLEM_AND_0_BEING_THE_INABILITY_TO_PERFORM_ANY_OF_YOUR_USUAL_DAILY_ACTIVITIES?: 100

## 2024-05-21 NOTE — PROGRESS NOTES
PHYSICAL THERAPY EVALUATION  Type of Visit: Evaluation  Pt reports onset of left hip adductor pain  while exercising on the leg press machine. Pt has had constant pain since. Pt referred for physical therapy on 24  See electronic medical record for Abuse and Falls Screening details.    Subjective       Presenting condition or subjective complaint: inner thigh pain  Date of onset: 24    Relevant medical history: Depression   Dates & types of surgery:      Prior diagnostic imaging/testing results:       Prior therapy history for the same diagnosis, illness or injury:        Prior Level of Function  Transfers: Independent  Ambulation: Independent  ADL: Independent  IADL: Driving, Housekeeping    Living Environment  Social support: With family members   Type of home: House   Stairs to enter the home: Yes       Ramp: No   Stairs inside the home: Yes 2 Is there a railing: Yes   Help at home: Medication and/or finances  Equipment owned: [employer]     Employment: Not Applicable    Hobbies/Interests: Playin Bass Drawing Video Games DnD    Patient goals for therapy: be able to bend my leg without pain    Pain assessment: Pain present  Location: left anterior/medial hip/Ratin/10     Objective   HIP:    Standing Posture: internal rotation of bilateral femurs     Lumbar Screen: WNL    SIJ Screen:     SL Balance:   R: good  sec (L hip drop)  L: fair sec (R hip drop)    Gait: weak pelvic stabilizers with swing through phase     Functional:   - Squat:   - SL squat:   - Lateral step down:     PROM: (* indicates patient's pain)   L  R   Flexion 105    Extension     Abduction 25    IR (supine 90-90)     ER (supine 90-90)       Strength:   L R   HIP     Flex 4-/5 with pain    Ext 5/5    Add (0-45-90 degrees: supine) 4/5 with pain    Abd 4-/5    KNEE     Flex 5/5    Ext 4/5      Special tests:   L R   Johnathan's +    Armando +    KENIA     FADIR +    SLR     Hamstring 90-90     Log Roll     SIJ Compression        Other:valgus collapse with unsupported squats   Palpation:  point tenderness proximal hip adductors             Assessment & Plan   CLINICAL IMPRESSIONS  Medical Diagnosis: left hip strain    Treatment Diagnosis: left hip pain, decreased strength/flexibility   Impression/Assessment: Patient is a 18 year old male with left hip  complaints.  The following significant findings have been identified: Pain, Decreased ROM/flexibility, and Decreased strength. These impairments interfere with their ability to perform self care tasks, recreational activities, household chores, driving , household mobility, and community mobility as compared to previous level of function.     Clinical Decision Making (Complexity):  Clinical Presentation: Stable/Uncomplicated  Clinical Presentation Rationale: based on medical and personal factors listed in PT evaluation  Clinical Decision Making (Complexity): Low complexity    PLAN OF CARE  Treatment Interventions:  Interventions: Therapeutic Exercise    Long Term Goals     PT Goal 1  Goal Identifier: squatting  Goal Description: pt able to squat pain level 1  Rationale: to maximize safety and independence with performance of ADLs and functional tasks;to maximize safety and independence within the home;to maximize safety and independence within the community;to maximize safety and independence with transportation;to maximize safety and independence with self cares  Target Date: 07/30/24      Frequency of Treatment: 1x/week  Duration of Treatment: 10 weeks    Recommended Referrals to Other Professionals:   Education Assessment:   Learner/Method: No Barriers to Learning    Risks and benefits of evaluation/treatment have been explained.   Patient/Family/caregiver agrees with Plan of Care.     Evaluation Time:             Signing Clinician: Alexi Flores PT      [facility]                                                                                   OUTPATIENT  PHYSICAL THERAPY      PLAN OF TREATMENT FOR OUTPATIENT REHABILITATION   Patient's Last Name, First Name, Marta Chow YOB: 2005   Provider's Name   [facility]   Medical Record No.  [id number]     Onset Date: 05/14/24  Start of Care Date: 05/21/24     Medical Diagnosis:  left hip strain      PT Treatment Diagnosis:  left hip pain, decreased strength/flexibility Plan of Treatment  Frequency/Duration: 1x/week/ 10 weeks    Certification date from 05/21/24 to 07/30/24         See note for plan of treatment details and functional goals     Alexi Flores, PT                         I CERTIFY THE NEED FOR THESE SERVICES FURNISHED UNDER        THIS PLAN OF TREATMENT AND WHILE UNDER MY CARE     (Physician attestation of this document indicates review and certification of the therapy plan).              Referring Provider:  Marshall Mansfield    Initial Assessment  See Epic Evaluation- Start of Care Date: 05/21/24

## 2024-05-23 ENCOUNTER — DOCUMENTATION ONLY (OUTPATIENT)
Dept: OTHER | Facility: CLINIC | Age: 19
End: 2024-05-23
Payer: COMMERCIAL

## 2024-05-31 ENCOUNTER — THERAPY VISIT (OUTPATIENT)
Dept: PHYSICAL THERAPY | Facility: CLINIC | Age: 19
End: 2024-05-31
Payer: COMMERCIAL

## 2024-05-31 DIAGNOSIS — M79.652 PAIN OF LEFT THIGH: Primary | ICD-10-CM

## 2024-05-31 DIAGNOSIS — S76.012A STRAIN OF LEFT HIP ADDUCTOR MUSCLE, INITIAL ENCOUNTER: ICD-10-CM

## 2024-05-31 PROCEDURE — 97110 THERAPEUTIC EXERCISES: CPT | Mod: GP | Performed by: PHYSICAL THERAPIST

## 2024-06-03 ENCOUNTER — THERAPY VISIT (OUTPATIENT)
Dept: PHYSICAL THERAPY | Facility: CLINIC | Age: 19
End: 2024-06-03
Payer: COMMERCIAL

## 2024-06-03 DIAGNOSIS — S76.012A STRAIN OF LEFT HIP ADDUCTOR MUSCLE, INITIAL ENCOUNTER: ICD-10-CM

## 2024-06-03 DIAGNOSIS — M79.652 PAIN OF LEFT THIGH: Primary | ICD-10-CM

## 2024-06-03 PROCEDURE — 97110 THERAPEUTIC EXERCISES: CPT | Mod: GP | Performed by: PHYSICAL THERAPIST

## 2024-06-05 ENCOUNTER — VIRTUAL VISIT (OUTPATIENT)
Dept: SURGERY | Facility: CLINIC | Age: 19
End: 2024-06-05
Payer: COMMERCIAL

## 2024-06-05 VITALS — WEIGHT: 202 LBS | BODY MASS INDEX: 31.71 KG/M2 | HEIGHT: 67 IN

## 2024-06-05 DIAGNOSIS — E66.9 OBESITY (BMI 30-39.9): Primary | ICD-10-CM

## 2024-06-05 DIAGNOSIS — F33.41 MAJOR DEPRESSIVE DISORDER, RECURRENT EPISODE, IN PARTIAL REMISSION (H): ICD-10-CM

## 2024-06-05 PROCEDURE — 99213 OFFICE O/P EST LOW 20 MIN: CPT | Mod: 95 | Performed by: FAMILY MEDICINE

## 2024-06-05 ASSESSMENT — PAIN SCALES - GENERAL: PAINLEVEL: NO PAIN (0)

## 2024-06-05 ASSESSMENT — PATIENT HEALTH QUESTIONNAIRE - PHQ9: SUM OF ALL RESPONSES TO PHQ QUESTIONS 1-9: 10

## 2024-06-05 NOTE — PROGRESS NOTES
Virtual Visit Details    Type of service:  Video Visit   Video Start Time: 3:23 PM  Video End Time:3:37 PM    Originating Location (pt. Location): Home    Distant Location (provider location):  Off-site  Platform used for Video Visit: [facility]      [facility] Non Surgical Follow up Visit   Date of visit: 6/5/2024  Physician: REA Murcia MD, MD  Primary Care is Marshall Mansfield.  Marta A Iesa   18 year old  male     Initial Weight: 216#    Today's Weight:   Wt Readings from Last 1 Encounters:   06/05/24 91.6 kg (202 lb)     Body mass index is 31.64 kg/m .           Assessment and Plan   Assessment: Marta is a 18 year old year old male who presents for medical weight management.      Plan:    1. Obesity (BMI 30-39.9)  Patient was congratulated on his success thus far. Healthy habits to assist with further weight loss were discussed. He has made many positive changes. He will continue the phentermine 1/2 tab and he was told he can take it daily.      2. Major depressive disorder, recurrent episode, in partial remission (H24)  He recently increased his medication and is working closely with his psychologist and psychiatrist. He has no suicidal ideation.     Follow up in 3-4 months with myself           INTERIM HISTORY  Patient started phentermine 1/2 tab for appetite and craving control after his last visit in February. He takes it intermittently. He does notice a decreased appetite when he takes it. He liked the extra energy he gets from.     DIETARY HISTORY  Meals Per Day: 2-3  Eating Protein First?: yes  Food Diary: B:protein bar L:skips (breakfast is often late) or turkey sandwich or salad D:parents prepare, often protein with rice and salad  Snacks Per Day: decreased, after dinner  Typical Snack: a few crackers  Fluid Intake: 64 oz  Portion Control: improved  Calorie Containing Beverages: none    Meals at Restaurant per week:0-1    Positive Changes Since Last Visit: increased water, decreased  "portions  Struggling With: none    Knowledgeable in Reading Food Labels: yes  Getting Adequate Protein: working on it      PHYSICAL ACTIVITY PATTERNS:  Goes to the gym, doing weight training 3 x per week, plans to increase to 5    REVIEW OF SYSTEMS  GENERAL/CONSTITUTIONAL:  Fatigue: sometimes  HEENT:   glaucoma: no  CARDIOVASCULAR:  History of heart disease: no       Patient Profile   Social History     Social History Narrative    Not on file        Past Medical History   Past Medical History:   Diagnosis Date    Anxiety     CARDIOVASCULAR SCREENING; LDL GOAL LESS THAN 160     Environmental allergies     Major depressive disorder, recurrent episode, in partial remission (H24)      Patient Active Problem List   Diagnosis    Myopia    CARDIOVASCULAR SCREENING; LDL GOAL LESS THAN 160    Major depressive disorder, recurrent episode, in partial remission (H24)    Anxiety    Environmental allergies    Pain of left thigh    Strain of left hip adductor muscle, initial encounter       Past Surgical History  He has a past surgical history that includes Dental surgery (2021).     Examination   Ht 1.702 m (5' 7\")   Wt 91.6 kg (202 lb)   BMI 31.64 kg/m    Wt Readings from Last 4 Encounters:   06/05/24 91.6 kg (202 lb)   05/14/24 94.8 kg (209 lb)   02/08/24 95.3 kg (210 lb)   10/30/23 98 kg (216 lb)      BP Readings from Last 3 Encounters:   05/14/24 122/76   02/08/24 110/64   10/23/23 128/62      GENERAL: alert and no distress  EYES: Eyes grossly normal to inspection.  No discharge or erythema, or obvious scleral/conjunctival abnormalities.  RESP: No audible wheeze, cough, or visible cyanosis.    SKIN: Visible skin clear. No significant rash, abnormal pigmentation or lesions.  NEURO: Cranial nerves grossly intact.  Mentation and speech appropriate for age.  PSYCH: Appropriate affect, tone, and pace of words        Counseling:   We reviewed the important post op bariatric recommendations:  -eating 3 meals daily  -eating protein " first, getting >60gm protein daily  -eating slowly, chewing food well  -avoiding/limiting calorie containing beverages  -limiting starchy vegetables and carbohydrates, choosing wheat, not white with breads,   crackers, pastas, peyton, bagels, tortillas, rice  -limiting restaurant or cafeteria eating to twice a week or less    We discussed the importance of restorative sleep and stress management in maintaining a healthy weight.  We discussed the National Weight Control Registry healthy weight maintenance strategies and ways to optimize metabolism.  We discussed the importance of physical activity including cardiovascular and strength training in maintaining a healthier weight.    Total time spent on the date of this encounter doing: chart review, review of test results, patient visit, physical exam, education, counseling, developing plan of care and documenting = 22 minutes.         REA Murcia MD  [facility]

## 2024-06-05 NOTE — NURSING NOTE
Is the patient currently in the state of [place]? YES    Visit mode:VIDEO    If the visit is dropped, the patient can be reconnected by: VIDEO VISIT: Send to e-mail at: [email or web address]    Will anyone else be joining the visit? NO  (If patient encounters technical issues they should call [phone]    How would you like to obtain your AVS? MyChart    Are changes needed to the allergy or medication list? No    Are refills needed on medications prescribed by this physician? NO    Reason for visit: RECHELI SEUPLVEDA

## 2024-06-05 NOTE — LETTER
6/5/2024      Marta Del Cid  [address] [place] [address]      Dear Colleague,    Thank you for referring your patient, Marta Del Cid, to the [facility] SURGERY CLINIC AND BARIATRICS CARE [place]. Please see a copy of my visit note below.    Virtual Visit Details    Type of service:  Video Visit   Video Start Time: 3:23 PM  Video End Time:3:37 PM    Originating Location (pt. Location): Home    Distant Location (provider location):  Off-site  Platform used for Video Visit: [facility]      Bariatric Care [facility] Non Surgical Follow up Visit   Date of visit: 6/5/2024  Physician: REA Murcia MD, MD  Primary Care is Marshall Mansfield.  Marta Del Cid   18 year old  male     Initial Weight: 216#    Today's Weight:   Wt Readings from Last 1 Encounters:   06/05/24 91.6 kg (202 lb)     Body mass index is 31.64 kg/m .           Assessment and Plan   Assessment: Marta is a 18 year old year old male who presents for medical weight management.      Plan:    1. Obesity (BMI 30-39.9)  Patient was congratulated on his success thus far. Healthy habits to assist with further weight loss were discussed. He has made many positive changes. He will continue the phentermine 1/2 tab and he was told he can take it daily.      2. Major depressive disorder, recurrent episode, in partial remission (H24)  He recently increased his medication and is working closely with his psychologist and psychiatrist. He has no suicidal ideation.     Follow up in 3-4 months with myself           INTERIM HISTORY  Patient started phentermine 1/2 tab for appetite and craving control after his last visit in February. He takes it intermittently. He does notice a decreased appetite when he takes it. He liked the extra energy he gets from.     DIETARY HISTORY  Meals Per Day: 2-3  Eating Protein First?: yes  Food Diary: B:protein bar L:skips (breakfast is often late) or turkey sandwich or salad D:parents prepare, often protein with rice and salad  Snacks Per Day:  "decreased, after dinner  Typical Snack: a few crackers  Fluid Intake: 64 oz  Portion Control: improved  Calorie Containing Beverages: none    Meals at Restaurant per week:0-1    Positive Changes Since Last Visit: increased water, decreased portions  Struggling With: none    Knowledgeable in Reading Food Labels: yes  Getting Adequate Protein: working on it      PHYSICAL ACTIVITY PATTERNS:  Goes to the gym, doing weight training 3 x per week, plans to increase to 5    REVIEW OF SYSTEMS  GENERAL/CONSTITUTIONAL:  Fatigue: sometimes  HEENT:   glaucoma: no  CARDIOVASCULAR:  History of heart disease: no       Patient Profile   Social History     Social History Narrative     Not on file        Past Medical History   Past Medical History:   Diagnosis Date     Anxiety      CARDIOVASCULAR SCREENING; LDL GOAL LESS THAN 160      Environmental allergies      Major depressive disorder, recurrent episode, in partial remission (H24)      Patient Active Problem List   Diagnosis     Myopia     CARDIOVASCULAR SCREENING; LDL GOAL LESS THAN 160     Major depressive disorder, recurrent episode, in partial remission (H24)     Anxiety     Environmental allergies     Pain of left thigh     Strain of left hip adductor muscle, initial encounter       Past Surgical History  He has a past surgical history that includes Dental surgery (2021).     Examination   Ht 1.702 m (5' 7\")   Wt 91.6 kg (202 lb)   BMI 31.64 kg/m    Wt Readings from Last 4 Encounters:   06/05/24 91.6 kg (202 lb)   05/14/24 94.8 kg (209 lb)   02/08/24 95.3 kg (210 lb)   10/30/23 98 kg (216 lb)      BP Readings from Last 3 Encounters:   05/14/24 122/76   02/08/24 110/64   10/23/23 128/62      GENERAL: alert and no distress  EYES: Eyes grossly normal to inspection.  No discharge or erythema, or obvious scleral/conjunctival abnormalities.  RESP: No audible wheeze, cough, or visible cyanosis.    SKIN: Visible skin clear. No significant rash, abnormal pigmentation or " lesions.  NEURO: Cranial nerves grossly intact.  Mentation and speech appropriate for age.  PSYCH: Appropriate affect, tone, and pace of words        Counseling:   We reviewed the important post op bariatric recommendations:  -eating 3 meals daily  -eating protein first, getting >60gm protein daily  -eating slowly, chewing food well  -avoiding/limiting calorie containing beverages  -limiting starchy vegetables and carbohydrates, choosing wheat, not white with breads,   crackers, pastas, peyton, bagels, tortillas, rice  -limiting restaurant or cafeteria eating to twice a week or less    We discussed the importance of restorative sleep and stress management in maintaining a healthy weight.  We discussed the National Weight Control Registry healthy weight maintenance strategies and ways to optimize metabolism.  We discussed the importance of physical activity including cardiovascular and strength training in maintaining a healthier weight.    Total time spent on the date of this encounter doing: chart review, review of test results, patient visit, physical exam, education, counseling, developing plan of care and documenting = 22 minutes.         REA Murcia MD  [facility]               Again, thank you for allowing me to participate in the care of your patient.        Sincerely,        REA uMrcia MD

## 2024-06-10 ENCOUNTER — THERAPY VISIT (OUTPATIENT)
Dept: PHYSICAL THERAPY | Facility: CLINIC | Age: 19
End: 2024-06-10
Payer: COMMERCIAL

## 2024-06-10 ENCOUNTER — VIRTUAL VISIT (OUTPATIENT)
Dept: SURGERY | Facility: CLINIC | Age: 19
End: 2024-06-10
Payer: COMMERCIAL

## 2024-06-10 DIAGNOSIS — M79.652 PAIN OF LEFT THIGH: Primary | ICD-10-CM

## 2024-06-10 DIAGNOSIS — S76.012A STRAIN OF LEFT HIP ADDUCTOR MUSCLE, INITIAL ENCOUNTER: ICD-10-CM

## 2024-06-10 DIAGNOSIS — Z71.3 NUTRITIONAL COUNSELING: ICD-10-CM

## 2024-06-10 DIAGNOSIS — E66.9 OBESITY (BMI 30-39.9): Primary | ICD-10-CM

## 2024-06-10 PROCEDURE — 97803 MED NUTRITION INDIV SUBSEQ: CPT | Mod: 93

## 2024-06-10 PROCEDURE — 97110 THERAPEUTIC EXERCISES: CPT | Mod: GP | Performed by: PHYSICAL THERAPIST

## 2024-06-10 PROCEDURE — 97035 APP MDLTY 1+ULTRASOUND EA 15: CPT | Mod: GP | Performed by: PHYSICAL THERAPIST

## 2024-06-10 NOTE — LETTER
6/10/2024      Marta Del Cid  [address] [place] [address]      Dear Colleague,    Thank you for referring your patient, Marta Del Cid, to the [facility] SURGERY CLINIC AND BARIATRICS CARE [place]. Please see a copy of my visit note below.    Marta Del Cid is a 18 year old who is being evaluated via a billable video visit.      How would you like to obtain your AVS? MyChart  If the video visit is dropped, the invitation should be resent by: Send to e-mail at: [email or web address]  Will anyone else be joining your video visit? No      Medical  Weight Loss Follow-Up Diet Evaluation  Assessment:  Marta is presenting today for a follow up weight management nutrition consultation.  This patient has had an initial appointment and was referred by Dr. Murcia for MNT as treatment for Obesity.  Weight loss medication: Phentermine.   Pt's weight is 200.2 lbs  Initial weight: 216 lbs  Weight change: 16 lbs down         6/5/2024    11:30 AM   Changes and Difficulties   I have made the following changes to my diet since my last visit: Drank more water   I have made the following changes to my activity/exercise since my last visit: Took a break from cardio   With regards to my activity/exercise, I am still struggling with: Fatigue and consistency     BMI: There is no height or weight on file to calculate BMI.  Ideal body weight: 66.1 kg (145 lb 11.6 oz)  Adjusted ideal body weight: 77.8 kg (171 lb 6.9 oz)    Estimated RMR (Redwood-St Jeor equation):   1785 kcals x 1.2 (sedentary) = 2140 kcals (for weight maintenance)   Recommended Protein Intake:  grams of protein/day  Patient Active Problem List:  Patient Active Problem List   Diagnosis     Myopia     CARDIOVASCULAR SCREENING; LDL GOAL LESS THAN 160     Major depressive disorder, recurrent episode, in partial remission (H24)     Anxiety     Environmental allergies     Pain of left thigh     Strain of left hip adductor muscle, initial encounter       Progress on goals  from last visit: Patient reports he is doing well nutritionally. Got a new job that has supported cutting out snacking. Sticking to 3 meals per day pattern. Schools stressors have decreased since the semester is over which had also helped lessen snacking habits.   -continues going to the gym  - new job (sedentary)     Choosing low fat/fat free dairy products - met   Have dinner 5:30 pm mind afternoon small snack at 3pm - met  Follow 10/10 rule - met         Dietary Recall:  Breakfast 8am: Saldivar protein bar OR greek yogurt OR cottage cheese on whole wheat bread   Lunch 10-12: turkey salad with whole wheat bread or salad   Dinner 4pm: protein (chicken, beef), carb (rice, protein pasta, potatoes) and 1/2 plate of salad  Typical Snacks:  not snacking lately, maybe chips   Overnight eating: No  Eating out: <1x/ week   Beverages:   Water 6-8 cups of water/day  Diet soda 2-3 cans per week      Exercise: Goes to the gym 3x per week (weights and cardio for 1 hour - 90 min)    PT exercises 1x per week for 40 min - current    Nutrition Diagnosis:    Obesity related to nutrition knowledge deficit as evidence by patient subjective report, skipping meals and BMI of 31.6        Intervention:  Food and/or nutrient delivery: consistency with meals, adequate protein intake hydration.  Nutrition education: none  Nutrition counseling: congratulated patient on success and continued goal setting.      Monitoring/Evaluation:    Goals:  Stay consistent with attending the gym   Keep protein intake consistent at each meal (add protein to bagged salad at lunch time- tuna or chicken ect)      Patient to follow up in 4 month(s) with bariatrician and 5 month(s) with RD        Virtual Visit Details    Type of service:  Telephone Visit     Phone call duration: 14 minutes     Originating Location (pt. Location): Home    Distant Location (provider location):  Off-site     Gerri Cartagena RD           Again, thank you for allowing me to participate  in the care of your patient.        Sincerely,        Gerri Cartagena RD

## 2024-06-10 NOTE — PROGRESS NOTES
Marta Del Cid is a 18 year old who is being evaluated via a billable video visit.      How would you like to obtain your AVS? MyChart  If the video visit is dropped, the invitation should be resent by: Send to e-mail at: [email or web address]  Will anyone else be joining your video visit? No      Medical  Weight Loss Follow-Up Diet Evaluation  Assessment:  Marta is presenting today for a follow up weight management nutrition consultation.  This patient has had an initial appointment and was referred by Dr. Murcia for MNT as treatment for Obesity.  Weight loss medication: Phentermine.   Pt's weight is 200.2 lbs  Initial weight: 216 lbs  Weight change: 16 lbs down         6/5/2024    11:30 AM   Changes and Difficulties   I have made the following changes to my diet since my last visit: Drank more water   I have made the following changes to my activity/exercise since my last visit: Took a break from cardio   With regards to my activity/exercise, I am still struggling with: Fatigue and consistency     BMI: There is no height or weight on file to calculate BMI.  Ideal body weight: 66.1 kg (145 lb 11.6 oz)  Adjusted ideal body weight: 77.8 kg (171 lb 6.9 oz)    Estimated RMR (Kendall-St Jeor equation):   1785 kcals x 1.2 (sedentary) = 2140 kcals (for weight maintenance)   Recommended Protein Intake:  grams of protein/day  Patient Active Problem List:  Patient Active Problem List   Diagnosis    Myopia    CARDIOVASCULAR SCREENING; LDL GOAL LESS THAN 160    Major depressive disorder, recurrent episode, in partial remission (H24)    Anxiety    Environmental allergies    Pain of left thigh    Strain of left hip adductor muscle, initial encounter       Progress on goals from last visit: Patient reports he is doing well nutritionally. Got a new job that has supported cutting out snacking. Sticking to 3 meals per day pattern. Schools stressors have decreased since the semester is over which had also helped lessen snacking  habits.   -continues going to the gym  - new job (sedentary)     Choosing low fat/fat free dairy products - met   Have dinner 5:30 pm mind afternoon small snack at 3pm - met  Follow 10/10 rule - met         Dietary Recall:  Breakfast 8am: Saldivar protein bar OR greek yogurt OR cottage cheese on whole wheat bread   Lunch 10-12: turkey salad with whole wheat bread or salad   Dinner 4pm: protein (chicken, beef), carb (rice, protein pasta, potatoes) and 1/2 plate of salad  Typical Snacks:  not snacking lately, maybe chips   Overnight eating: No  Eating out: <1x/ week   Beverages:   Water 6-8 cups of water/day  Diet soda 2-3 cans per week      Exercise: Goes to the gym 3x per week (weights and cardio for 1 hour - 90 min)    PT exercises 1x per week for 40 min - current    Nutrition Diagnosis:    Obesity related to nutrition knowledge deficit as evidence by patient subjective report, skipping meals and BMI of 31.6        Intervention:  Food and/or nutrient delivery: consistency with meals, adequate protein intake hydration.  Nutrition education: none  Nutrition counseling: congratulated patient on success and continued goal setting.      Monitoring/Evaluation:    Goals:  Stay consistent with attending the gym   Keep protein intake consistent at each meal (add protein to bagged salad at lunch time- tuna or chicken ect)      Patient to follow up in 4 month(s) with bariatrician and 5 month(s) with SARINA        Virtual Visit Details    Type of service:  Telephone Visit     Phone call duration: 14 minutes     Originating Location (pt. Location): Home    Distant Location (provider location):  Off-site     Gerri Cartagena RD

## 2024-06-24 ENCOUNTER — THERAPY VISIT (OUTPATIENT)
Dept: PHYSICAL THERAPY | Facility: CLINIC | Age: 19
End: 2024-06-24
Payer: COMMERCIAL

## 2024-06-24 DIAGNOSIS — M79.652 PAIN OF LEFT THIGH: Primary | ICD-10-CM

## 2024-06-24 DIAGNOSIS — S76.012A STRAIN OF LEFT HIP ADDUCTOR MUSCLE, INITIAL ENCOUNTER: ICD-10-CM

## 2024-06-24 PROCEDURE — 97035 APP MDLTY 1+ULTRASOUND EA 15: CPT | Mod: GP | Performed by: PHYSICAL THERAPIST

## 2024-06-24 PROCEDURE — 97110 THERAPEUTIC EXERCISES: CPT | Mod: GP | Performed by: PHYSICAL THERAPIST

## 2024-07-01 ENCOUNTER — THERAPY VISIT (OUTPATIENT)
Dept: PHYSICAL THERAPY | Facility: CLINIC | Age: 19
End: 2024-07-01
Payer: COMMERCIAL

## 2024-07-01 DIAGNOSIS — S76.012A STRAIN OF LEFT HIP ADDUCTOR MUSCLE, INITIAL ENCOUNTER: ICD-10-CM

## 2024-07-01 DIAGNOSIS — M79.652 PAIN OF LEFT THIGH: Primary | ICD-10-CM

## 2024-07-01 PROCEDURE — 97110 THERAPEUTIC EXERCISES: CPT | Mod: GP | Performed by: PHYSICAL THERAPIST

## 2024-07-01 PROCEDURE — 97035 APP MDLTY 1+ULTRASOUND EA 15: CPT | Mod: GP | Performed by: PHYSICAL THERAPIST

## 2024-07-12 ENCOUNTER — THERAPY VISIT (OUTPATIENT)
Dept: PHYSICAL THERAPY | Facility: CLINIC | Age: 19
End: 2024-07-12
Payer: COMMERCIAL

## 2024-07-12 DIAGNOSIS — S76.012A STRAIN OF LEFT HIP ADDUCTOR MUSCLE, INITIAL ENCOUNTER: ICD-10-CM

## 2024-07-12 DIAGNOSIS — M79.652 PAIN OF LEFT THIGH: Primary | ICD-10-CM

## 2024-07-12 PROCEDURE — 97110 THERAPEUTIC EXERCISES: CPT | Mod: GP

## 2024-07-12 PROCEDURE — 97112 NEUROMUSCULAR REEDUCATION: CPT | Mod: GP

## 2024-07-12 NOTE — PROGRESS NOTES
07/12/24 0500   Appointment Info   Signing clinician's name / credentials Jeri Hough, PT, DPT   Total/Authorized Visits 10   Visits Used 7   Medical Diagnosis left hip strain   PT Tx Diagnosis left hip pain, decreased strength/flexibility   Other pertinent information re-eval after 6 visits   Quick Adds Certification   Progress Note/Certification   Start of Care Date 05/21/24   Onset of illness/injury or Date of Surgery 05/14/24   Therapy Frequency 1x/week   Predicted Duration 10 weeks   Certification date from 05/21/24   Certification date to 07/30/24   PT Goal 1   Goal Identifier squatting   Goal Description pt able to squat pain level 1   Rationale to maximize safety and independence with performance of ADLs and functional tasks;to maximize safety and independence within the home;to maximize safety and independence within the community;to maximize safety and independence with transportation;to maximize safety and independence with self cares   Goal Progress Progressing - improved with band   Target Date 07/30/24   Subjective Report   Subjective Report Pt reports that the pain that he had at last visit has since decreased.   Objective Measures   Objective Measures Objective Measure 1   Objective Measure 1   Objective Measure weakness and pain noted with left hip adduction   PT Modalities   PT Modalities Cryotherapy;Ultrasound   Cryotherapy   Treatment Detail HEP   Ultrasound   Ultrasound -Type (does not include 3-5 min prep/cleanup time) Pulsed 50%   Intensity 1.2   Duration (does not include the 3-5 min set up/clean up time) 8 min   Frequency 2 MHz   Location left hip adductor   Positioning supine   Patient Response/Progress not today   Treatment Interventions (PT)   Interventions Therapeutic Procedure/Exercise;Neuromuscular Re-education   Therapeutic Procedure/Exercise   Therapeutic Procedures: strength, endurance, ROM, flexibility minutes (01750) 29   Therapeutic Procedures Ther Proc 2;Ther Proc  3;Ther Proc 4;Ther Proc 5;Ther Proc 6;Ther Proc 7   Ther Proc 1 Bike SH 4   Ther Proc 1 - Details x4 min warm-up; subjective information taken   Ther Proc 2 Sidestep   Ther Proc 2 - Details 1x15/side - verbal and visual cues for good form and keeping feet at least hip width apart   Ther Proc 3 Monster walks   Ther Proc 3 - Details 1x15-20 steps fwd and bckwd - verbal and visual cues for good form and muscle engagement   Ther Proc 4 Hip EXT in standing   Ther Proc 4 - Details trialed 1x10/side RTB - 1x12/side AG - verbal and visual cues for good form and glut engagement   Ther Proc 5 sidelying hip ADD   Ther Proc 5 - Details 2x12 - verbal and visual cues for good form and muscle engagement - edu to stop if pain kicks in - very fatiguing; muscle shake at end of each set   Ther Proc 6 Squats with TB   Ther Proc 6 - Details 2x10 RTB above knees - verbal and visual cues for outward pressure into the band; cues to perform at elevated surface, as deeper squat increased anterior thigh pain   Skilled Intervention progression of HEP - addition of squatting with band to engage gluts; cues for good form and muscle engagment   Patient Response/Progress Pt tolerated well without significant increase inpain   Neuromuscular Re-education   Neuromuscular re-ed of mvmt, balance, coord, kinesthetic sense, posture, proprioception minutes (77231) 10   Neuromuscular Re-education Neuro Re-ed 2   Neuro Re-ed 1 Bounding   Neuro Re-ed 1 - Details 3x10/side - starting with small hops side to side, slowly increasing distance each set   Skilled Intervention Balance training   Patient Response/Progress Pt tolerated well without increase in pain   Neuro Re-ed 2 SL Balance 2x30 sec on solid floor, EO   Neuro Re-ed 2 - Details SL balance with ball toss/catch - 2x1 min   Education   Learner/Method No Barriers to Learning   Plan   Home program see PTRX   Updates to plan of care Added NM re-ed to work on progressing balance and jumping activities    Plan for next session progress as tolerated   Total Session Time   Timed Code Treatment Minutes 39   Total Treatment Time (sum of timed and untimed services) 39         PLAN  Continue therapy per current plan of care. Added NM re-ed to POC    Beginning/End Dates of Progress Note Reporting Period:    to 07/12/2024    Referring Provider:  Marshall Mansfield

## 2024-07-17 ENCOUNTER — THERAPY VISIT (OUTPATIENT)
Dept: PHYSICAL THERAPY | Facility: CLINIC | Age: 19
End: 2024-07-17
Payer: COMMERCIAL

## 2024-07-17 DIAGNOSIS — M79.652 PAIN OF LEFT THIGH: Primary | ICD-10-CM

## 2024-07-17 DIAGNOSIS — S76.012A STRAIN OF LEFT HIP ADDUCTOR MUSCLE, INITIAL ENCOUNTER: ICD-10-CM

## 2024-07-17 PROCEDURE — 97112 NEUROMUSCULAR REEDUCATION: CPT | Mod: GP | Performed by: PHYSICAL THERAPIST

## 2024-07-17 PROCEDURE — 97110 THERAPEUTIC EXERCISES: CPT | Mod: GP | Performed by: PHYSICAL THERAPIST

## 2024-07-25 ENCOUNTER — THERAPY VISIT (OUTPATIENT)
Dept: PHYSICAL THERAPY | Facility: CLINIC | Age: 19
End: 2024-07-25
Payer: COMMERCIAL

## 2024-07-25 DIAGNOSIS — S76.012A STRAIN OF LEFT HIP ADDUCTOR MUSCLE, INITIAL ENCOUNTER: ICD-10-CM

## 2024-07-25 DIAGNOSIS — M79.652 PAIN OF LEFT THIGH: Primary | ICD-10-CM

## 2024-07-25 PROCEDURE — 97110 THERAPEUTIC EXERCISES: CPT | Mod: GP | Performed by: PHYSICAL THERAPIST

## 2024-07-25 PROCEDURE — 97112 NEUROMUSCULAR REEDUCATION: CPT | Mod: GP | Performed by: PHYSICAL THERAPIST

## 2024-07-25 NOTE — PROGRESS NOTES
07/25/24 0500   Appointment Info   Total/Authorized Visits 10   Visits Used 8   Medical Diagnosis left hip strain   PT Tx Diagnosis left hip pain, decreased strength/flexibility   Other pertinent information re-eval after 6 visits   Quick Adds Certification   Progress Note/Certification   Start of Care Date 05/21/24   Onset of illness/injury or Date of Surgery 05/14/24   Therapy Frequency 1x/week   Predicted Duration 10 weeks   Certification date from 05/21/24   Certification date to 07/30/24   Progress Note Completed Date 07/25/24   PT Goal 1   Goal Identifier squatting   Goal Description pt able to squat pain level 1   Rationale to maximize safety and independence with performance of ADLs and functional tasks;to maximize safety and independence within the home;to maximize safety and independence within the community;to maximize safety and independence with transportation;to maximize safety and independence with self cares   Goal Progress Met   Target Date 07/30/24   Date Met 07/25/24   Subjective Report   Subjective Report Can now lift the left leg into flex and adduction without pain, but still has pain when sitting.  Locates the pain in the ant  hip and groin area.  States that the hip is improving, but not where he wants it to be.   Objective Measures   Objective Measures Objective Measure 1   Objective Measure 1   Objective Measure Pain with passive hip flex and IR.  Strong throughout hip with no pain.  Limited passive hip IR.   PT Modalities   PT Modalities Cryotherapy;Ultrasound   Cryotherapy   Treatment Detail HEP   Ultrasound   Positioning supine   Treatment Interventions (PT)   Interventions Therapeutic Procedure/Exercise;Neuromuscular Re-education   Therapeutic Procedure/Exercise   Therapeutic Procedures: strength, endurance, ROM, flexibility minutes (02524) 30   Therapeutic Procedures Ther Proc 2;Ther Proc 3;Ther Proc 4;Ther Proc 5;Ther Proc 6;Ther Proc 7;Ther Proc 8;Ther Proc 9;Ther Proc 10   Ther  "Proc 1 Bike SH 4   Ther Proc 1 - Details 4 min 15\" fast/slow intervals x 3 min   Ther Proc 2 Sidestep   Ther Proc 2 - Details 2x10 steps witjh red band at ankles   Ther Proc 3 Monster walks   Ther Proc 3 - Details 2x10 steps with red band at ankles; forward and backward   Ther Proc 4 Lateral lunges   Ther Proc 4 - Details 2x10 bilat   Ther Proc 5 sidelying hip ADD   Ther Proc 5 - Details 2x10 1#   Ther Proc 6 Squats with TB   Ther Proc 6 - Details 2x10 RTB above knees - verbal and visual cues for outward pressure into the band; cues to perform at elevated surface, as deeper squat increased anterior thigh pain   Ther Proc 7 Leg press (SH=5)   Ther Proc 7 - Details 3x10 130#   Ther Proc 8 Standing adductor stretch   Ther Proc 8 - Details 3x10\" (left foot up on plinth)   Ther Proc 9 Standing hip flexor stretch   Ther Proc 9 - Details 3x10\" (foot up on chair)   Skilled Intervention progression of HEP - addition of squatting with band to engage gluts; cues for good form and muscle engagment   Patient Response/Progress Pt tolerated well without significant increase inpain   Ther Proc 10 SL bridges   Ther Proc 10 - Details 2x10   Neuromuscular Re-education   Neuromuscular re-ed of mvmt, balance, coord, kinesthetic sense, posture, proprioception minutes (00614) 10   Neuromuscular Re-education Neuro Re-ed 2;Neuro Re-ed 3   Neuro Re-ed 1 Agility ladder   Neuro Re-ed 1 - Details quick feet drills: 2x each   Neuro Re-ed 2 SL Balance 2x30 sec on solid floor, EO   Neuro Re-ed 2 - Details SL balance with ball toss/catch - 2x1 min   Neuro Re-ed 3 Quick shuffles   Neuro Re-ed 3 - Details 5x 1/2 length of room   Skilled Intervention Balance training   Patient Response/Progress Pt tolerated well without increase in pain   Education   Learner/Method No Barriers to Learning   Plan   Home program see PTRX   Plan for next session DC with HEP.  Pt advised to f/u with MD if he is not seeing continued improvment over the next 6 weeks. "   Total Session Time   Timed Code Treatment Minutes 40   Total Treatment Time (sum of timed and untimed services) 40       DISCHARGE  Reason for Discharge: Pt is improving and independent with a HEP.    Equipment Issued: none    Discharge Plan: Patient to continue home program.    Referring Provider:  Marshall Mansfield

## 2024-08-06 ENCOUNTER — MYC REFILL (OUTPATIENT)
Dept: SURGERY | Facility: CLINIC | Age: 19
End: 2024-08-06
Payer: COMMERCIAL

## 2024-08-06 DIAGNOSIS — E66.9 OBESITY (BMI 30-39.9): ICD-10-CM

## 2024-08-07 RX ORDER — PHENTERMINE HYDROCHLORIDE 37.5 MG/1
TABLET ORAL
Qty: 45 TABLET | Refills: 0 | Status: SHIPPED | OUTPATIENT
Start: 2024-08-07

## 2024-08-08 ENCOUNTER — OFFICE VISIT (OUTPATIENT)
Dept: FAMILY MEDICINE | Facility: CLINIC | Age: 19
End: 2024-08-08
Payer: COMMERCIAL

## 2024-08-08 VITALS
WEIGHT: 191 LBS | HEART RATE: 120 BPM | RESPIRATION RATE: 18 BRPM | BODY MASS INDEX: 29.91 KG/M2 | OXYGEN SATURATION: 99 % | DIASTOLIC BLOOD PRESSURE: 80 MMHG | SYSTOLIC BLOOD PRESSURE: 108 MMHG | TEMPERATURE: 97.9 F

## 2024-08-08 DIAGNOSIS — Z91.09 ENVIRONMENTAL ALLERGIES: Primary | ICD-10-CM

## 2024-08-08 PROBLEM — M79.652 PAIN OF LEFT THIGH: Status: RESOLVED | Noted: 2024-05-21 | Resolved: 2024-08-08

## 2024-08-08 PROCEDURE — 99213 OFFICE O/P EST LOW 20 MIN: CPT | Performed by: FAMILY MEDICINE

## 2024-08-08 PROCEDURE — 36415 COLL VENOUS BLD VENIPUNCTURE: CPT | Performed by: FAMILY MEDICINE

## 2024-08-08 PROCEDURE — 82785 ASSAY OF IGE: CPT | Performed by: FAMILY MEDICINE

## 2024-08-08 PROCEDURE — 86003 ALLG SPEC IGE CRUDE XTRC EA: CPT | Performed by: FAMILY MEDICINE

## 2024-08-08 RX ORDER — BUPROPION HYDROCHLORIDE 150 MG/1
150 TABLET ORAL EVERY MORNING
COMMUNITY
Start: 2024-08-08

## 2024-08-08 RX ORDER — BUPROPION HYDROCHLORIDE 150 MG/1
1 TABLET ORAL
COMMUNITY
Start: 2024-07-22 | End: 2024-08-08

## 2024-08-08 RX ORDER — LORATADINE 10 MG/1
10 TABLET ORAL DAILY
COMMUNITY
Start: 2024-08-08

## 2024-08-08 ASSESSMENT — ANXIETY QUESTIONNAIRES
GAD7 TOTAL SCORE: 9
2. NOT BEING ABLE TO STOP OR CONTROL WORRYING: NOT AT ALL
GAD7 TOTAL SCORE: 9
5. BEING SO RESTLESS THAT IT IS HARD TO SIT STILL: NEARLY EVERY DAY
3. WORRYING TOO MUCH ABOUT DIFFERENT THINGS: NEARLY EVERY DAY
4. TROUBLE RELAXING: NOT AT ALL
GAD7 TOTAL SCORE: 9
1. FEELING NERVOUS, ANXIOUS, OR ON EDGE: SEVERAL DAYS
7. FEELING AFRAID AS IF SOMETHING AWFUL MIGHT HAPPEN: NOT AT ALL
8. IF YOU CHECKED OFF ANY PROBLEMS, HOW DIFFICULT HAVE THESE MADE IT FOR YOU TO DO YOUR WORK, TAKE CARE OF THINGS AT HOME, OR GET ALONG WITH OTHER PEOPLE?: SOMEWHAT DIFFICULT
6. BECOMING EASILY ANNOYED OR IRRITABLE: MORE THAN HALF THE DAYS
IF YOU CHECKED OFF ANY PROBLEMS ON THIS QUESTIONNAIRE, HOW DIFFICULT HAVE THESE PROBLEMS MADE IT FOR YOU TO DO YOUR WORK, TAKE CARE OF THINGS AT HOME, OR GET ALONG WITH OTHER PEOPLE: SOMEWHAT DIFFICULT
7. FEELING AFRAID AS IF SOMETHING AWFUL MIGHT HAPPEN: NOT AT ALL

## 2024-08-08 ASSESSMENT — PATIENT HEALTH QUESTIONNAIRE - PHQ9
SUM OF ALL RESPONSES TO PHQ QUESTIONS 1-9: 11
SUM OF ALL RESPONSES TO PHQ QUESTIONS 1-9: 11
10. IF YOU CHECKED OFF ANY PROBLEMS, HOW DIFFICULT HAVE THESE PROBLEMS MADE IT FOR YOU TO DO YOUR WORK, TAKE CARE OF THINGS AT HOME, OR GET ALONG WITH OTHER PEOPLE: SOMEWHAT DIFFICULT

## 2024-08-08 NOTE — PROGRESS NOTES
Assessment & Plan   Environmental allergies  Recent exposure to cat and cause some pruritus of the eyes, better now, did have repeat exposure to the same cat and did not have any issues and he thinks it may have been due to dander exposure when he touched his eye.  Does have known allergies to pollens.  He would like allergy testing.  Recommended Claritin if known exposure is going to occur.  - [place] Resp Allergen Panel  - loratadine (CLARITIN) 10 MG tablet          Return in about 1 month (around 9/8/2024) for symptoms failing to improve or sooner if worsening.          Edmar Payne MD      [facility]  [address] [place] [id number]  [email or web address]   Office: [phone]         Jean Lozano is a 18 year old, presenting for the following health issues:  Derm Problem    History of Present Illness       Reason for visit:  Allergy test and second opinion    He eats 2-3 servings of fruits and vegetables daily.He consumes 0 sweetened beverage(s) daily.He exercises with enough effort to increase his heart rate 30 to 60 minutes per day.  He exercises with enough effort to increase his heart rate 3 or less days per week.   He is taking medications regularly.     Patient petted a cat and touched his eye and it got all red and itching - last month at cousins house        Objective    /80   Pulse 120   Temp 97.9  F (36.6  C) (Tympanic)   Resp 18   Wt 86.6 kg (191 lb)   SpO2 99%   BMI 29.91 kg/m    Body mass index is 29.91 kg/m .  Physical Exam   GENERAL: alert and no distress  NECK: no adenopathy, no asymmetry, masses, or scars  RESP: lungs clear to auscultation - no rales, rhonchi or wheezes  CV: regular rate and rhythm, normal S1 S2, no S3 or S4, no murmur, click or rub, no peripheral edema  ABDOMEN: soft, nontender, no hepatosplenomegaly, no masses and bowel sounds normal  MS: no gross musculoskeletal defects noted, no edema  SKIN: Couple skin colored small papules  on the nose that he was wondering if they are serious and they do not look so and probable minimally pigmented nevus otherwise no suspicious lesions or rashes  BACK: no CVA tenderness, no paralumbar tenderness  PSYCH: mentation appears normal, affect normal/bright            Signed Electronically by: Julio C Payne MD

## 2024-08-11 LAB
A ALTERNATA IGE QN: <0.1 KU(A)/L
A FUMIGATUS IGE QN: <0.1 KU(A)/L
BERMUDA GRASS IGE QN: <0.1 KU(A)/L
C HERBARUM IGE QN: <0.1 KU(A)/L
CAT DANDER IGG QN: 1.99 KU(A)/L
CEDAR IGE QN: 0.42 KU(A)/L
COMMON RAGWEED IGE QN: 0.6 KU(A)/L
COTTONWOOD IGE QN: <0.1 KU(A)/L
D FARINAE IGE QN: 7.88 KU(A)/L
D PTERONYSS IGE QN: 10.6 KU(A)/L
DOG DANDER+EPITH IGE QN: 0.59 KU(A)/L
IGE SERPL-ACNC: 183 KU/L (ref 0–114)
MAPLE IGE QN: 2.16 KU(A)/L
MARSH ELDER IGE QN: <0.1 KU(A)/L
MOUSE URINE PROT IGE QN: <0.1 KU(A)/L
NETTLE IGE QN: 0.1 KU(A)/L
P NOTATUM IGE QN: <0.1 KU(A)/L
ROACH IGE QN: <0.1 KU(A)/L
SALTWORT IGE QN: <0.1 KU(A)/L
SILVER BIRCH IGE QN: 13.1 KU(A)/L
TIMOTHY IGE QN: <0.1 KU(A)/L
WHITE ASH IGE QN: <0.1 KU(A)/L
WHITE ELM IGE QN: <0.1 KU(A)/L
WHITE MULBERRY IGE QN: <0.1 KU(A)/L
WHITE OAK IGE QN: 9.74 KU(A)/L

## 2024-08-12 NOTE — RESULT ENCOUNTER NOTE
Dear Marta,    Here is a summary of your recent test results:  -Your allergy testing showed you are allergic to many things including cats, dogs, dust mites, many trees and ragweed.  Making sure to take a daily antihistamine such as Claritin (loratadine) or fexofenadine (Allegra) would be recommended.    For additional lab test information, www.testing.com is a very good reference.    In addition, here is a list of due or overdue Health Maintenance reminders:    Yearly Preventive Visit due on 08/10/2022    Please call us at [phone] (or use A[employer]) to address the above recommendations if needed.           Thank you very much for trusting me and [facility].     Have a peaceful day.    Healthy regards,  Edmar Payne MD

## 2024-08-31 ENCOUNTER — TELEPHONE (OUTPATIENT)
Dept: FAMILY MEDICINE | Facility: CLINIC | Age: 19
End: 2024-08-31
Payer: COMMERCIAL

## 2024-08-31 NOTE — TELEPHONE ENCOUNTER
Call from patient asking for a refill b/c he only has one tablet left. Reviewed patient's medication list and informed him that he received a refill on 5/14/24 (#90 + 3 refills). Advised he contact [facility] today since he has refills. Patient wants refills to be transferred to a different pharmacy. Advised him on how to get it transferred and asked he call back if there are any issues. Caller verbalized understanding.      Teddy Kruger RN, BSN  Triage Nurse Advisor

## 2024-08-31 NOTE — TELEPHONE ENCOUNTER
Medication Question or Refill    Contacts       Contact Date/Time Type Contact Phone/Fax    08/31/2024 05:34 PM CDT Phone (Incoming) Marta Del Cid (Self) [phone] (M)            What medication are you calling about (include dose and sig)?: sertraline (ZOLOFT) 50 MG tablet     Preferred Pharmacy:   [facility]  [address] [place] [address]  Phone: [phone]      Controlled Substance Agreement on file:   CSA -- Patient Level:    CSA: None found at the patient level.       Who prescribed the medication?: PCP    Do you need a refill? Yes    When did you use the medication last? Last night and only has 1 left    Patient offered an appointment? No    Do you have any questions or concerns?  Yes: what can PT do before withdrawals symptoms start      Could we send this information to you in [place] or would you prefer to receive a phone call?:   No preference   Okay to leave a detailed message?: Yes at Cell number on file:    Telephone Information:   Mobile [phone]

## 2024-10-01 NOTE — PROGRESS NOTES
Marta Del Cid is 19 year old  male who presents for a billable video visit today.  [id number]}    Video Start Time:  1:38 pm      Video-Visit Details    Type of service:  Video Visit    Platform used for Video Visit: [employer]    Video End Time (time video stopped): 1:46 PM    Originating Location (pt. Location): Home      Distant Location (provider location):  Off-site    Distant Location (provider location):  [facility]          Bariatric Care Clinic Non Surgical Follow up Visit   Date of visit: 10/2/2024  Physician: REA Murcia MD, MD  Primary Care is [facility].  Marta Del Cid   19 year old  male     Initial Weight: 216#  Today's Weight:   Wt Readings from Last 1 Encounters:   10/02/24 81.2 kg (179 lb)     Body mass index is 28.04 kg/m .           Assessment and Plan   Assessment: Marta is a 19 year old year old male who presents for medical weight management.      Plan:    1. Obesity (BMI 30-39.9)  Patient was congratulated on his success thus far. Healthy habits to assist with further weight loss were discussed. He will try to decrease unhealthy snacking. He will continue the phentermine 1/2 tab per day. We could try increasing the dose in the future if needed.    2. Major depressive disorder, recurrent episode, in partial remission (H)  This may improve with healthy habits and weight loss.      Follow up in 4 months with myself           INTERIM HISTORY  Patient is taking phentermine 1/2 tab daily for appetite and craving control and he thinks it is helping. He denies side effects. His anxiety has not been triggered.    DIETARY HISTORY  Meals Per Day: 3  Eating Protein First?: sometimes  Food Diary: B:protein bar L:sandwich D:protein and vegetable and starch  Snacks Per Day: 3  Typical Snack: chips  Fluid Intake: 64 oz  Portion Control: improved  Calorie Containing Beverages: none    Meals at Restaurant per week:0-1    Positive Changes Since Last Visit: portion  control, protein first, muscle building activity, water intake  Struggling With: snacking    Knowledgeable in Reading Food Labels: yes  Getting Adequate Protein: he tries to  Sleeping 7-8 hours/day sometimes    PHYSICAL ACTIVITY PATTERNS:  Some weight training    REVIEW OF SYSTEMS  GENERAL/CONSTITUTIONAL:  Fatigue: sometimes  HEENT:   glaucoma: no  CARDIOVASCULAR:  History of heart disease: no  GI:  Pancreatitis: no  PSYCHIATRIC:  Moods: stable  MUSCULOSKELETAL/RHEUMATOLOGIC  Arthralgias: no  Myalgias: no  ENDOCRINE:  Monitoring Blood Sugars: no  Sugars Well Controlled: na  No personal or family history of medullary thyroid cancer no  No personal or family history of MEN2   :  Birth control: male  History of kidney stones: no     Patient Profile   Social History     Social History Narrative    Not on file        Past Medical History   Past Medical History:   Diagnosis Date    Anxiety     CARDIOVASCULAR SCREENING; LDL GOAL LESS THAN 160     Environmental allergies     Major depressive disorder, recurrent episode, in partial remission (H)     Myopia 10/30/2012     Patient Active Problem List   Diagnosis    Major depressive disorder, recurrent episode, in partial remission (H)    Anxiety    Environmental allergies    Strain of left hip adductor muscle, initial encounter       Past Surgical History  He has a past surgical history that includes Dental surgery (2021).     Examination   Wt 81.2 kg (179 lb)   BMI 28.04 kg/m    Wt Readings from Last 4 Encounters:   10/02/24 81.2 kg (179 lb)   08/08/24 86.6 kg (191 lb)   06/05/24 91.6 kg (202 lb)   05/14/24 94.8 kg (209 lb)      BP Readings from Last 3 Encounters:   08/08/24 108/80   05/14/24 122/76   02/08/24 110/64      GENERAL: alert and no distress  EYES: Eyes grossly normal to inspection.  No discharge or erythema, or obvious scleral/conjunctival abnormalities.  RESP: No audible wheeze, cough, or visible cyanosis.    SKIN: Visible skin clear. No significant rash,  abnormal pigmentation or lesions.  NEURO: Cranial nerves grossly intact.  Mentation and speech appropriate for age.  PSYCH: Appropriate affect, tone, and pace of words        Counseling:   We reviewed the important post op bariatric recommendations:  -eating 3 meals daily  -eating protein first, getting >60gm protein daily  -eating slowly, chewing food well  -avoiding/limiting calorie containing beverages  -limiting starchy vegetables and carbohydrates, choosing wheat, not white with breads,   crackers, pastas, peyton, bagels, tortillas, rice  -limiting restaurant or cafeteria eating to twice a week or less    We discussed the importance of restorative sleep and stress management in maintaining a healthy weight.  We discussed the National Weight Control Registry healthy weight maintenance strategies and ways to optimize metabolism.  We discussed the importance of physical activity including cardiovascular and strength training in maintaining a healthier weight.    Total time spent on the date of this encounter doing: chart review, review of test results, patient visit, physical exam, education, counseling, developing plan of care and documenting = 23 minutes.         REA Murcia MD  [facility]

## 2024-10-02 ENCOUNTER — VIRTUAL VISIT (OUTPATIENT)
Dept: SURGERY | Facility: CLINIC | Age: 19
End: 2024-10-02
Payer: COMMERCIAL

## 2024-10-02 VITALS — WEIGHT: 179 LBS | BODY MASS INDEX: 28.04 KG/M2

## 2024-10-02 DIAGNOSIS — F33.41 MAJOR DEPRESSIVE DISORDER, RECURRENT EPISODE, IN PARTIAL REMISSION (H): ICD-10-CM

## 2024-10-02 DIAGNOSIS — E66.9 OBESITY (BMI 30-39.9): Primary | ICD-10-CM

## 2024-10-02 PROCEDURE — 99213 OFFICE O/P EST LOW 20 MIN: CPT | Mod: 95 | Performed by: FAMILY MEDICINE

## 2024-10-02 NOTE — PATIENT INSTRUCTIONS

## 2024-10-02 NOTE — LETTER
10/2/2024      Marta Del Cid  [address] [place] [address]      Dear Colleague,    Thank you for referring your patient, Marta Del Cid, [facility]. Please see a copy of my visit note below.    Marta Del Cid is 19 year old  male who presents for a billable video visit today.  [id number]}    Video Start Time:  1:38 pm      Video-Visit Details    Type of service:  Video Visit    Platform used for Video Visit: [employer]    Video End Time (time video stopped): 1:46 PM    Originating Location (pt. Location): Home      Distant Location (provider location):  Off-site    Distant Location (provider location):  [facility]          Bariatric Care Clinic Non Surgical Follow up Visit   Date of visit: 10/2/2024  Physician: REA Murcia MD, MD  Primary Care is [facility].  Marta Del Cid   19 year old  male     Initial Weight: 216#  Today's Weight:   Wt Readings from Last 1 Encounters:   10/02/24 81.2 kg (179 lb)     Body mass index is 28.04 kg/m .           Assessment and Plan   Assessment: Marta is a 19 year old year old male who presents for medical weight management.      Plan:    1. Obesity (BMI 30-39.9)  Patient was congratulated on his success thus far. Healthy habits to assist with further weight loss were discussed. He will try to decrease unhealthy snacking. He will continue the phentermine 1/2 tab per day. We could try increasing the dose in the future if needed.    2. Major depressive disorder, recurrent episode, in partial remission (H)  This may improve with healthy habits and weight loss.      Follow up in 4 months with myself           INTERIM HISTORY  Patient is taking phentermine 1/2 tab daily for appetite and craving control and he thinks it is helping. He denies side effects. His anxiety has not been triggered.    DIETARY HISTORY  Meals Per Day: 3  Eating Protein First?: sometimes  Food Diary: B:protein bar  L:sandwich D:protein and vegetable and starch  Snacks Per Day: 3  Typical Snack: chips  Fluid Intake: 64 oz  Portion Control: improved  Calorie Containing Beverages: none    Meals at Restaurant per week:0-1    Positive Changes Since Last Visit: portion control, protein first, muscle building activity, water intake  Struggling With: snacking    Knowledgeable in Reading Food Labels: yes  Getting Adequate Protein: he tries to  Sleeping 7-8 hours/day sometimes    PHYSICAL ACTIVITY PATTERNS:  Some weight training    REVIEW OF SYSTEMS  GENERAL/CONSTITUTIONAL:  Fatigue: sometimes  HEENT:   glaucoma: no  CARDIOVASCULAR:  History of heart disease: no  GI:  Pancreatitis: no  PSYCHIATRIC:  Moods: stable  MUSCULOSKELETAL/RHEUMATOLOGIC  Arthralgias: no  Myalgias: no  ENDOCRINE:  Monitoring Blood Sugars: no  Sugars Well Controlled: na  No personal or family history of medullary thyroid cancer no  No personal or family history of MEN2   :  Birth control: male  History of kidney stones: no     Patient Profile   Social History     Social History Narrative     Not on file        Past Medical History   Past Medical History:   Diagnosis Date     Anxiety      CARDIOVASCULAR SCREENING; LDL GOAL LESS THAN 160      Environmental allergies      Major depressive disorder, recurrent episode, in partial remission (H)      Myopia 10/30/2012     Patient Active Problem List   Diagnosis     Major depressive disorder, recurrent episode, in partial remission (H)     Anxiety     Environmental allergies     Strain of left hip adductor muscle, initial encounter       Past Surgical History  He has a past surgical history that includes Dental surgery (2021).     Examination   Wt 81.2 kg (179 lb)   BMI 28.04 kg/m    Wt Readings from Last 4 Encounters:   10/02/24 81.2 kg (179 lb)   08/08/24 86.6 kg (191 lb)   06/05/24 91.6 kg (202 lb)   05/14/24 94.8 kg (209 lb)      BP Readings from Last 3 Encounters:   08/08/24 108/80   05/14/24 122/76   02/08/24  110/64      GENERAL: alert and no distress  EYES: Eyes grossly normal to inspection.  No discharge or erythema, or obvious scleral/conjunctival abnormalities.  RESP: No audible wheeze, cough, or visible cyanosis.    SKIN: Visible skin clear. No significant rash, abnormal pigmentation or lesions.  NEURO: Cranial nerves grossly intact.  Mentation and speech appropriate for age.  PSYCH: Appropriate affect, tone, and pace of words        Counseling:   We reviewed the important post op bariatric recommendations:  -eating 3 meals daily  -eating protein first, getting >60gm protein daily  -eating slowly, chewing food well  -avoiding/limiting calorie containing beverages  -limiting starchy vegetables and carbohydrates, choosing wheat, not white with breads,   crackers, pastas, peyton, bagels, tortillas, rice  -limiting restaurant or cafeteria eating to twice a week or less    We discussed the importance of restorative sleep and stress management in maintaining a healthy weight.  We discussed the National Weight Control Registry healthy weight maintenance strategies and ways to optimize metabolism.  We discussed the importance of physical activity including cardiovascular and strength training in maintaining a healthier weight.    Total time spent on the date of this encounter doing: chart review, review of test results, patient visit, physical exam, education, counseling, developing plan of care and documenting = 23 minutes.         REA Murcia MD  [facility]               Again, thank you for allowing me to participate in the care of your patient.        Sincerely,        REA Murcia MD

## 2024-11-01 ENCOUNTER — MYC REFILL (OUTPATIENT)
Dept: SURGERY | Facility: CLINIC | Age: 19
End: 2024-11-01
Payer: COMMERCIAL

## 2024-11-01 DIAGNOSIS — E66.9 OBESITY (BMI 30-39.9): ICD-10-CM

## 2024-11-02 RX ORDER — PHENTERMINE HYDROCHLORIDE 37.5 MG/1
TABLET ORAL
Qty: 45 TABLET | Refills: 0 | Status: SHIPPED | OUTPATIENT
Start: 2024-11-02

## 2024-11-21 ENCOUNTER — VIRTUAL VISIT (OUTPATIENT)
Dept: SURGERY | Facility: CLINIC | Age: 19
End: 2024-11-21
Payer: COMMERCIAL

## 2024-11-21 DIAGNOSIS — Z71.3 NUTRITIONAL COUNSELING: ICD-10-CM

## 2024-11-21 DIAGNOSIS — E66.3 OVERWEIGHT WITH BODY MASS INDEX (BMI) OF 27 TO 27.9 IN ADULT: Primary | ICD-10-CM

## 2024-11-21 NOTE — LETTER
11/21/2024      Marta Del Cid  [address] [place] [address]      Dear Colleague,    Thank you for referring your patient, Marta Del Cid, to the [facility] [place]. Please see a copy of my visit note below.    Marta Del Cid is a 19 year old who is being evaluated via a billable video visit.      How would you like to obtain your AVS? MyChart  If the video visit is dropped, the invitation should be resent by: Send to e-mail at: [email or web address]  Will anyone else be joining your video visit? No      Medical  Weight Loss Follow-Up Diet Evaluation  Assessment:  Marta is presenting today for a follow up weight management nutrition consultation.  This patient has had an initial appointment and was referred by Dr. Murcia for MNT as treatment for  excessive weight .  Weight loss medication: Phentermine. (1/2 tablet)  Pt's weight is 176 lbs  Initial weight: 216 lbs  Weight change: 40 lbs down         10/2/2024    12:47 PM   Changes and Difficulties   I have made the following changes to my diet since my last visit: Taking medication consistently and excising more self control   With regards to my diet, I am still struggling with: not much   I have made the following changes to my activity/exercise since my last visit: Changed work out routine a bit   With regards to my activity/exercise, I am still struggling with: consistency     BMI: 27.6  Ideal body weight: 66.1 kg (145 lb 11.6 oz)  Adjusted ideal body weight: 77.8 kg (171 lb 6.9 oz)    Estimated RMR (Jessamine-St Jeor equation):   1785 kcals x 1.2 (sedentary) = 2140 kcals (for weight maintenance)   Recommended Protein Intake:  grams of protein/day  Patient Active Problem List:  Patient Active Problem List   Diagnosis     Major depressive disorder, recurrent episode, in partial remission (H)     Anxiety     Environmental allergies     Strain of left hip adductor muscle, initial encounter       Progress on goals from last  visit: Patient is doing well nutritionally. Noted he started a new job that keeps him pretty active. Tracks his steps at work -10-16k. No questions or concerns per patient. Is very happy with results thus far.    Stay consistent with attending the gym - not met- new job  Keep protein intake consistent at each meal (add protein to bagged salad at lunch time- tuna or chicken ect) - met        Dietary Recall:  Breakfast 8am: Saldivar protein bar OR greek yogurt OR cottage cheese on whole wheat bread   Lunch 10-12: turkey salad with whole wheat bread or salad   Dinner 4pm: protein (chicken, beef), carb (rice, protein pasta, potatoes) and 1/2 plate of salad  Typical Snacks:  not snacking lately, maybe chips   Overnight eating: No  Eating out: <1x/ week   Beverages:   Water 40oz  Diet soda 2-3 cans per week    SF Energy drink occasionally   Exercise:     Nutrition Diagnosis:    Overweight related to nutrition knowledge deficit as evidence by patient subjective report, skipping meals and BMI of 27 lbs       Intervention:  Food and/or nutrient delivery: consistency with meals, adequate protein intake hydration.  Nutrition education: none  Nutrition counseling: congratulated patient on success and continued goal setting.      Monitoring/Evaluation:    Goals:  80-100g protein/day goal  Adjusted calorie intake for weight loss 1539-3694  Aim for x2 bottles of water/day (80oz)      Patient to follow up in 2.5 month(s) with bariatrician and 3 month(s) with SARINA          Virtual Visit Details    Type of service:  Video Visit   Video Start Time: 3:55 PM  Video End Time:4:05 PM    Originating Location (pt. Location): Home    Distant Location (provider location):  Off-site  Platform used for Video Visit: John Colin RD             Again, thank you for allowing me to participate in the care of your patient.        Sincerely,        Gerri Colin RD

## 2024-11-21 NOTE — PROGRESS NOTES
Marta Del Cid is a 19 year old who is being evaluated via a billable video visit.      How would you like to obtain your AVS? MyChart  If the video visit is dropped, the invitation should be resent by: Send to e-mail at: [email or web address]  Will anyone else be joining your video visit? No      Medical  Weight Loss Follow-Up Diet Evaluation  Assessment:  Marta is presenting today for a follow up weight management nutrition consultation.  This patient has had an initial appointment and was referred by Dr. Murcia for MNT as treatment for  excessive weight .  Weight loss medication: Phentermine. (1/2 tablet)  Pt's weight is 176 lbs  Initial weight: 216 lbs  Weight change: 40 lbs down         10/2/2024    12:47 PM   Changes and Difficulties   I have made the following changes to my diet since my last visit: Taking medication consistently and excising more self control   With regards to my diet, I am still struggling with: not much   I have made the following changes to my activity/exercise since my last visit: Changed work out routine a bit   With regards to my activity/exercise, I am still struggling with: consistency     BMI: 27.6  Ideal body weight: 66.1 kg (145 lb 11.6 oz)  Adjusted ideal body weight: 77.8 kg (171 lb 6.9 oz)    Estimated RMR (Saunders-St Jeor equation):   1785 kcals x 1.2 (sedentary) = 2140 kcals (for weight maintenance)   Recommended Protein Intake:  grams of protein/day  Patient Active Problem List:  Patient Active Problem List   Diagnosis    Major depressive disorder, recurrent episode, in partial remission (H)    Anxiety    Environmental allergies    Strain of left hip adductor muscle, initial encounter       Progress on goals from last visit: Patient is doing well nutritionally. Noted he started a new job that keeps him pretty active. Tracks his steps at work -10-16k. No questions or concerns per patient. Is very happy with results thus far.    Stay consistent with attending the gym - not met-  new job  Keep protein intake consistent at each meal (add protein to bagged salad at lunch time- tuna or chicken ect) - met        Dietary Recall:  Breakfast 8am: Saldivar protein bar OR greek yogurt OR cottage cheese on whole wheat bread   Lunch 10-12: turkey salad with whole wheat bread or salad   Dinner 4pm: protein (chicken, beef), carb (rice, protein pasta, potatoes) and 1/2 plate of salad  Typical Snacks:  not snacking lately, maybe chips   Overnight eating: No  Eating out: <1x/ week   Beverages:   Water 40oz  Diet soda 2-3 cans per week    SF Energy drink occasionally   Exercise:     Nutrition Diagnosis:    Overweight related to nutrition knowledge deficit as evidence by patient subjective report, skipping meals and BMI of 27 lbs       Intervention:  Food and/or nutrient delivery: consistency with meals, adequate protein intake hydration.  Nutrition education: none  Nutrition counseling: congratulated patient on success and continued goal setting.      Monitoring/Evaluation:    Goals:  80-100g protein/day goal  Adjusted calorie intake for weight loss [id number]  Aim for x2 bottles of water/day (80oz)      Patient to follow up in 2.5 month(s) with bariatrician and 3 month(s) with SARINA          Virtual Visit Details    Type of service:  Video Visit   Video Start Time: 3:55 PM  Video End Time:4:05 PM    Originating Location (pt. Location): Home    Distant Location (provider location):  Off-site  Platform used for Video Visit: John Colin RD

## 2025-01-11 ENCOUNTER — HEALTH MAINTENANCE LETTER (OUTPATIENT)
Age: 20
End: 2025-01-11

## 2025-02-03 NOTE — PROGRESS NOTES
Marta Del Cid is 19 year old  male who presents for a billable video visit today.    Video Start Time: 11:45 AM    Video-Visit Details    Type of service:  Video Visit    Platform used for Video Visit: Hubspan    Video End Time (time video stopped): 11:59 AM    Originating Location (pt. Location):  in his car in [place]      Distant Location (provider location):  Off-site    Distant Location (provider location):  [facility]          Bariatric Care Clinic Non Surgical Follow up Visit   Date of visit: 2/5/2025  Physician: REA Murcia MD, MD  Primary Care is [facility].  Marta Del Cid   19 year old  male     Initial Weight: 216#  Today's Weight:   Wt Readings from Last 1 Encounters:   02/05/25 78 kg (172 lb)     Body mass index is 26.94 kg/m .           Assessment and Plan   Assessment: Marta is a 19 year old year old male who presents for medical weight management.      Plan:    1. Overweight with body mass index (BMI) of 27 to 27.9 in adult (Primary)  Patient was congratulated on his success thus far. Healthy habits to assist with further weight loss were discussed. He doesn't feel he can really make any changes until his depression improves. He doesn't think he can get to the gym. He will continue to work with his mental health providers.He will continue the phentermine. We discussed the patient's co-morbid conditions including depression. This may improve with healthy habits and weight loss.       2. Major depressive disorder, recurrent episode, in partial remission  This may improve with healthy habits and weight loss.  He will continue to work with his mental health providers.    Follow up next available with myself and the dietician           INTERIM HISTORY  Patient is taking phentermine (1/2 tab) for appetite and craving control and he thinks it is helping. He denies side effects. He is not feeling hungry often. He is struggling with his depression and this is  "causing extreme fatigue.    DIETARY HISTORY  Meals Per Day: 2-3  Eating Protein First?: yes  Food Diary: B:protein bar L:skips and snacking instead: turkey stick or string cheese D:protein and vegetable and starch  Snacks Per Day: afternoons  Typical Snack: cheese or turkey stick  Fluid Intake: working on it, not sure how much  Portion Control: yes  Calorie Containing Beverages: none    Meals at Restaurant per week:once a week    Positive Changes Since Last Visit: continues protein first, portion control  Struggling With: fatigue, exercise    Knowledgeable in Reading Food Labels: yes  Getting Adequate Protein: not sure  Sleeping 7-8 hours/day 12, depression  Stress management discussed exercise    PHYSICAL ACTIVITY PATTERNS:  He stopped lifting weights at the end of December. He reports fatigue and time are getting in the way. He started a job that is quite physically active.    REVIEW OF SYSTEMS  GENERAL/CONSTITUTIONAL:  Fatigue: yes  HEENT:   glaucoma: no  CARDIOVASCULAR:  History of heart disease: no  PSYCHIATRIC:  Moods: struggling  MUSCULOSKELETAL/RHEUMATOLOGIC  Arthralgias: no  Myalgias: no  ENDOCRINE:  Monitoring Blood Sugars: no  Sugars Well Controlled: na       Patient Profile   Social History     Social History Narrative    Not on file        Past Medical History   Past Medical History:   Diagnosis Date    Anxiety     CARDIOVASCULAR SCREENING; LDL GOAL LESS THAN 160     Environmental allergies     Major depressive disorder, recurrent episode, in partial remission     Myopia 10/30/2012     Patient Active Problem List   Diagnosis    Major depressive disorder, recurrent episode, in partial remission    Anxiety    Environmental allergies    Strain of left hip adductor muscle, initial encounter       Past Surgical History  He has a past surgical history that includes Dental surgery (2021).     Examination   Ht 1.702 m (5' 7\")   Wt 78 kg (172 lb)   BMI 26.94 kg/m    Wt Readings from Last 4 Encounters: "   02/05/25 78 kg (172 lb)   10/02/24 81.2 kg (179 lb)   08/08/24 86.6 kg (191 lb)   06/05/24 91.6 kg (202 lb)      BP Readings from Last 3 Encounters:   08/08/24 108/80   05/14/24 122/76   02/08/24 110/64      GENERAL: alert and no distress  EYES: Eyes grossly normal to inspection.  No discharge or erythema, or obvious scleral/conjunctival abnormalities.  RESP: No audible wheeze, cough, or visible cyanosis.    SKIN: Visible skin clear. No significant rash, abnormal pigmentation or lesions.  NEURO: Cranial nerves grossly intact.  Mentation and speech appropriate for age.  PSYCH: Appropriate affect, tone, and pace of words        Counseling:   We reviewed the important post op bariatric recommendations:  -eating 3 meals daily  -eating protein first, getting >60gm protein daily  -eating slowly, chewing food well  -avoiding/limiting calorie containing beverages  -limiting starchy vegetables and carbohydrates, choosing wheat, not white with breads,   crackers, pastas, peyton, bagels, tortillas, rice  -limiting restaurant or cafeteria eating to twice a week or less    We discussed the importance of restorative sleep and stress management in maintaining a healthy weight.  We discussed the National Weight Control Registry healthy weight maintenance strategies and ways to optimize metabolism.  We discussed the importance of physical activity including cardiovascular and strength training in maintaining a healthier weight.    Total time spent on the date of this encounter doing: chart review, review of test results, patient visit, physical exam, education, counseling, developing plan of care and documenting = 41 minutes.         REA Murcia MD  [facility]

## 2025-02-05 ENCOUNTER — VIRTUAL VISIT (OUTPATIENT)
Dept: SURGERY | Facility: CLINIC | Age: 20
End: 2025-02-05
Payer: COMMERCIAL

## 2025-02-05 VITALS — WEIGHT: 172 LBS | HEIGHT: 67 IN | BODY MASS INDEX: 27 KG/M2

## 2025-02-05 DIAGNOSIS — E66.9 OBESITY (BMI 30-39.9): ICD-10-CM

## 2025-02-05 DIAGNOSIS — E66.3 OVERWEIGHT WITH BODY MASS INDEX (BMI) OF 27 TO 27.9 IN ADULT: Primary | ICD-10-CM

## 2025-02-05 DIAGNOSIS — F33.41 MAJOR DEPRESSIVE DISORDER, RECURRENT EPISODE, IN PARTIAL REMISSION: ICD-10-CM

## 2025-02-05 PROCEDURE — G2211 COMPLEX E/M VISIT ADD ON: HCPCS | Performed by: FAMILY MEDICINE

## 2025-02-05 PROCEDURE — 98007 SYNCH AUDIO-VIDEO EST HI 40: CPT | Performed by: FAMILY MEDICINE

## 2025-02-05 RX ORDER — PHENTERMINE HYDROCHLORIDE 37.5 MG/1
TABLET ORAL
Qty: 45 TABLET | Refills: 0 | Status: SHIPPED | OUTPATIENT
Start: 2025-02-05

## 2025-02-05 NOTE — NURSING NOTE
Is the patient currently in the state of [place]? YES    Location: vehicle    Visit mode:VIDEO    If the visit is dropped, the patient can be reconnected by: VIDEO VISIT: Text to cell phone:   Telephone Information:   Mobile [phone]    and VIDEO VISIT: Send to e-mail at: [email or web address]    Will anyone else be joining the visit? NO  (If patient encounters technical issues they should call [phone] :[phone])    Are changes needed to the allergy or medication list? No    Are refills needed on medications prescribed by this physician? NO    Reason for visit: MONICA Cramer, Virtual Visit Facilitator    QNR Status: completed

## 2025-02-05 NOTE — LETTER
2/5/2025      Marta Del Cid  [address] [place] [address]      Dear Colleague,    [facility] you for referring your patient, Marta Del Cid, [facility] Please see a copy of my visit note below.    Marta Del Cid is 19 year old  male who presents for a billable video visit today.    Video Start Time: 11:45 AM    Video-Visit Details    Type of service:  Video Visit    Platform used for Video Visit: [email or web address]    Video End Time (time video stopped): 11:59 AM    Originating Location (pt. Location):  in his car in [place]    {PROVIDER LOCATION On-site should be selected for visits conducted from your clinic location or adjoining [facility] hospital, academic office, or other nearby [facility] building. Off-site should be selected for all other provider locations, including home:563914}  Distant Location (provider location):  Off-site    Distant Location (provider location):  [facility]          Bariatric Care Clinic Non Surgical Follow up Visit   Date of visit: 2/5/2025  Physician: REA Murcia MD, MD  Primary Care is [facility].  Marta Del Cid   19 year old  male     Initial Weight: 216#  Today's Weight:   Wt Readings from Last 1 Encounters:   02/05/25 78 kg (172 lb)     Body mass index is 26.94 kg/m .           Assessment and Plan   Assessment: Marta is a 19 year old year old male who presents for medical weight management.      Plan:    1. Overweight with body mass index (BMI) of 27 to 27.9 in adult (Primary)  Patient was congratulated on his success thus far. Healthy habits to assist with further weight loss were discussed. He doesn't feel he can really make any changes until his depression improves. He doesn't think he can get to the gym. He will continue to work with his mental health providers.He will continue the phentermine. We discussed the patient's co-morbid conditions including depression. This may improve with healthy habits  and weight loss.       2. Major depressive disorder, recurrent episode, in partial remission  This may improve with healthy habits and weight loss.  He will continue to work with his mental health providers.    Follow up next available with myself and the dietician           INTERIM HISTORY  Patient is taking phentermine (1/2 tab) for appetite and craving control and he thinks it is helping. He denies side effects. He is not feeling hungry often. He is struggling with his depression and this is causing extreme fatigue.    DIETARY HISTORY  Meals Per Day: 2-3  Eating Protein First?: yes  Food Diary: B:protein bar L:skips and snacking instead: turkey stick or string cheese D:protein and vegetable and starch  Snacks Per Day: afternoons  Typical Snack: cheese or turkey stick  Fluid Intake: working on it, not sure how much  Portion Control: yes  Calorie Containing Beverages: none    Meals at Restaurant per week:once a week    Positive Changes Since Last Visit: continues protein first, portion control  Struggling With: fatigue, exercise    Knowledgeable in Reading Food Labels: yes  Getting Adequate Protein: not sure  Sleeping 7-8 hours/day 12, depression  Stress management discussed exercise    PHYSICAL ACTIVITY PATTERNS:  He stopped lifting weights at the end of December. He reports fatigue and time are getting in the way. He started a job that is quite physically active.    REVIEW OF SYSTEMS  GENERAL/CONSTITUTIONAL:  Fatigue: yes  HEENT:   glaucoma: no  CARDIOVASCULAR:  History of heart disease: no  PSYCHIATRIC:  Moods: struggling  MUSCULOSKELETAL/RHEUMATOLOGIC  Arthralgias: no  Myalgias: no  ENDOCRINE:  Monitoring Blood Sugars: no  Sugars Well Controlled: na       Patient Profile   Social History     Social History Narrative     Not on file        Past Medical History   Past Medical History:   Diagnosis Date     Anxiety      CARDIOVASCULAR SCREENING; LDL GOAL LESS THAN 160      Environmental allergies      Major  "depressive disorder, recurrent episode, in partial remission      Myopia 10/30/2012     Patient Active Problem List   Diagnosis     Major depressive disorder, recurrent episode, in partial remission     Anxiety     Environmental allergies     Strain of left hip adductor muscle, initial encounter       Past Surgical History  He has a past surgical history that includes Dental surgery (2021).     Examination   Ht 1.702 m (5' 7\")   Wt 78 kg (172 lb)   BMI 26.94 kg/m    Wt Readings from Last 4 Encounters:   02/05/25 78 kg (172 lb)   10/02/24 81.2 kg (179 lb)   08/08/24 86.6 kg (191 lb)   06/05/24 91.6 kg (202 lb)      BP Readings from Last 3 Encounters:   08/08/24 108/80   05/14/24 122/76   02/08/24 110/64      GENERAL: alert and no distress  EYES: Eyes grossly normal to inspection.  No discharge or erythema, or obvious scleral/conjunctival abnormalities.  RESP: No audible wheeze, cough, or visible cyanosis.    SKIN: Visible skin clear. No significant rash, abnormal pigmentation or lesions.  NEURO: Cranial nerves grossly intact.  Mentation and speech appropriate for age.  PSYCH: Appropriate affect, tone, and pace of words        Counseling:   We reviewed the important post op bariatric recommendations:  -eating 3 meals daily  -eating protein first, getting >60gm protein daily  -eating slowly, chewing food well  -avoiding/limiting calorie containing beverages  -limiting starchy vegetables and carbohydrates, choosing wheat, not white with breads,   crackers, pastas, peyton, bagels, tortillas, rice  -limiting restaurant or cafeteria eating to twice a week or less    We discussed the importance of restorative sleep and stress management in maintaining a healthy weight.  We discussed the National Weight Control Registry healthy weight maintenance strategies and ways to optimize metabolism.  We discussed the importance of physical activity including cardiovascular and strength training in maintaining a healthier " "weight.    Total time spent on the date of this encounter doing: chart review, review of test results, patient visit, physical exam, education, counseling, developing plan of care and documenting = 41 minutes.         REA Murcia MD  [facility]           Virtual Visit Details    Type of service:  Video Visit   Video Start Time: {video visit start/end time for provider to select:496440}  Video End Time:{video visit start/end time for provider to select:294702}    Originating Location (pt. Location): {video visit patient location:218239::\"Home\"}  {PROVIDER LOCATION On-site should be selected for visits conducted from your clinic location or adjoining [facility] hospital, academic office, or other nearby [facility] building. Off-site should be selected for all other provider locations, including home:126699}  Distant Location (provider location):  {virtual location provider:262803}  Platform used for Video Visit: {Virtual Visit Platforms:617448::\"Rithmio\"}      Again, thank you for allowing me to participate in the care of your patient.        Sincerely,        REA Murcia MD    Electronically signed"

## 2025-06-09 ENCOUNTER — RESULTS FOLLOW-UP (OUTPATIENT)
Dept: INTERNAL MEDICINE | Facility: CLINIC | Age: 20
End: 2025-06-09

## 2025-06-09 ENCOUNTER — PATIENT OUTREACH (OUTPATIENT)
Dept: CARE COORDINATION | Facility: CLINIC | Age: 20
End: 2025-06-09
Payer: COMMERCIAL

## 2025-06-10 DIAGNOSIS — E66.9 OBESITY (BMI 30-39.9): ICD-10-CM

## 2025-06-10 RX ORDER — PHENTERMINE HYDROCHLORIDE 37.5 MG/1
TABLET ORAL
Qty: 45 TABLET | Refills: 0 | Status: SHIPPED | OUTPATIENT
Start: 2025-06-10

## 2025-06-10 RX ORDER — PHENTERMINE HYDROCHLORIDE 37.5 MG/1
TABLET ORAL
Qty: 45 TABLET | Refills: 0 | Status: CANCELLED | OUTPATIENT
Start: 2025-06-10

## 2025-06-11 ENCOUNTER — PATIENT OUTREACH (OUTPATIENT)
Dept: CARE COORDINATION | Facility: CLINIC | Age: 20
End: 2025-06-11
Payer: COMMERCIAL

## 2025-06-12 ENCOUNTER — TELEPHONE (OUTPATIENT)
Dept: SURGERY | Facility: CLINIC | Age: 20
End: 2025-06-12
Payer: COMMERCIAL

## 2025-06-12 NOTE — TELEPHONE ENCOUNTER
Prior Authorization Retail Medication Request    Medication/Dose: Phentermine HCI 37.5mg Tablets  New/renewal/insurance change PA/secondary ins. PA:  Previously Tried and Failed:      Watching Portions or Calories    Exercise    Atkins-type Diet (Low Carb/High Protein)    Fasting     Rationale:  Pt has been taking this medication and has lost 45 pounds.     Key: [id number]    Pharmacy Information (if different than what is on RX)  Name:  [id number] [place], [place]   Phone:  [phone]   Fax:  [phone]     Clinic Information  Preferred routing pool for dept communication: Bariatric Surgery Support Pool Eas

## 2025-06-13 NOTE — TELEPHONE ENCOUNTER
Prior Authorization Not Needed per Insurance    Medication: PHENTERMINE HCL 37.5 MG PO TABS  Insurance Company: [employer] - Phone [phone] Fax [phone]  Expected CoPay: $    Pharmacy Filling the Rx:  [facility]  Pharmacy Notified: Yes  Patient Notified: No, pt already picked up medication

## 2025-08-19 ENCOUNTER — PATIENT OUTREACH (OUTPATIENT)
Dept: CARE COORDINATION | Facility: CLINIC | Age: 20
End: 2025-08-19
Payer: COMMERCIAL